# Patient Record
Sex: FEMALE | Race: WHITE | NOT HISPANIC OR LATINO | Employment: OTHER | URBAN - NONMETROPOLITAN AREA
[De-identification: names, ages, dates, MRNs, and addresses within clinical notes are randomized per-mention and may not be internally consistent; named-entity substitution may affect disease eponyms.]

---

## 2017-07-11 DIAGNOSIS — G93.89 BRAIN MASS: Primary | ICD-10-CM

## 2017-07-13 ENCOUNTER — TRANSCRIBE ORDERS (OUTPATIENT)
Dept: ADMINISTRATIVE | Facility: HOSPITAL | Age: 72
End: 2017-07-13

## 2017-07-13 DIAGNOSIS — M81.0 OSTEOPOROSIS, UNSPECIFIED: Primary | ICD-10-CM

## 2017-07-18 ENCOUNTER — HOSPITAL ENCOUNTER (OUTPATIENT)
Dept: BONE DENSITY | Facility: HOSPITAL | Age: 72
Discharge: HOME | End: 2017-07-18
Admitting: INTERNAL MEDICINE

## 2017-07-18 DIAGNOSIS — M81.0 OSTEOPOROSIS, UNSPECIFIED: ICD-10-CM

## 2017-07-18 PROCEDURE — 77080 DXA BONE DENSITY AXIAL: CPT | Performed by: RADIOLOGY

## 2017-07-18 PROCEDURE — 77080 DXA BONE DENSITY AXIAL: CPT

## 2017-08-24 ENCOUNTER — HOSPITAL ENCOUNTER (OUTPATIENT)
Dept: MRI IMAGING | Facility: HOSPITAL | Age: 72
Discharge: HOME | End: 2017-08-24
Admitting: PHYSICIAN ASSISTANT

## 2017-08-24 ENCOUNTER — OFFICE VISIT (OUTPATIENT)
Dept: NEUROSURGERY | Facility: CLINIC | Age: 72
End: 2017-08-24

## 2017-08-24 VITALS
WEIGHT: 133.6 LBS | OXYGEN SATURATION: 97 % | RESPIRATION RATE: 18 BRPM | HEIGHT: 62 IN | BODY MASS INDEX: 24.59 KG/M2 | DIASTOLIC BLOOD PRESSURE: 81 MMHG | SYSTOLIC BLOOD PRESSURE: 156 MMHG | HEART RATE: 98 BPM

## 2017-08-24 DIAGNOSIS — D33.0 BENIGN NEOPLASM OF SUPRATENTORIAL REGION OF BRAIN (HCC): Primary | ICD-10-CM

## 2017-08-24 DIAGNOSIS — G93.89 BRAIN MASS: ICD-10-CM

## 2017-08-24 PROCEDURE — 99213 OFFICE O/P EST LOW 20 MIN: CPT | Performed by: NEUROLOGICAL SURGERY

## 2017-08-24 PROCEDURE — A9577 INJ MULTIHANCE: HCPCS | Performed by: PHYSICIAN ASSISTANT

## 2017-08-24 PROCEDURE — 70553 MRI BRAIN STEM W/O & W/DYE: CPT

## 2017-08-24 PROCEDURE — 0 GADOBENATE DIMEGLUMINE 529 MG/ML SOLUTION: Performed by: PHYSICIAN ASSISTANT

## 2017-08-24 PROCEDURE — 70553 MRI BRAIN STEM W/O & W/DYE: CPT | Performed by: RADIOLOGY

## 2017-08-24 RX ADMIN — GADOBENATE DIMEGLUMINE 12 ML: 529 INJECTION, SOLUTION INTRAVENOUS at 11:15

## 2017-08-24 NOTE — PROGRESS NOTES
Estella Kaur  1945  [id number]      No chief complaint on file.      HISTORY OF PRESENT ILLNESS:  [ 71-year-old admitted for follow-up of a interventricular benign tumor.  She's has no symptoms]    Past Medical History:   Diagnosis Date   • Arthritis    • Disease of thyroid gland    • Fracture of right distal radius    • History of transfusion    • Hypercholesteremia        Past Surgical History:   Procedure Laterality Date   • APPENDECTOMY     • CHOLECYSTECTOMY     • HYSTERECTOMY     • JOINT REPLACEMENT        hips    both   • ORIF WRIST FRACTURE Right 8/24/2016    Procedure: WRIST OPEN REDUCTION INTERNAL FIXATION;  Surgeon: Shadi Womack MD;  Location: [facility];  Service:    • THYROID SURGERY  2002    thyroidectomy w/ metal clips   • TOTAL HIP ARTHROPLASTY      bilateral        Family History   Problem Relation Age of Onset   • Hyperlipidemia Mother    • Hyperlipidemia Father        Social History     Social History   • Marital status:      Spouse name: N/A   • Number of children: N/A   • Years of education: N/A     Occupational History   • Not on file.     Social History Main Topics   • Smoking status: Never Smoker   • Smokeless tobacco: Never Used   • Alcohol use No   • Drug use: No   • Sexual activity: Defer     Other Topics Concern   • Not on file     Social History Narrative       Allergies   Allergen Reactions   • Codeine Nausea And Vomiting   • Contrast Dye Nausea And Vomiting         Current Outpatient Prescriptions:   •  Garlic 1000 MG capsule, Take 1 tablet by mouth., Disp: , Rfl:   •  naproxen sodium (ANAPROX) 550 MG tablet, TK 1 T PO BID PRN, Disp: , Rfl: 0  •  pravastatin (PRAVACHOL) 40 MG tablet, Take 40 mg by mouth daily. Patient reports she is unable to take medication r/t leg pain., Disp: , Rfl:   •  traMADol (ULTRAM) 50 MG tablet, Take 1 tablet by mouth every 6 (six) hours as needed for moderate pain (4-6) for up to 20 doses., Disp: 20 tablet, Rfl: 0  No current  "facility-administered medications for this visit.     Review of Systems   HENT: Positive for ear discharge and ear pain.    Neurological: Positive for dizziness and light-headedness.   All other systems reviewed and are negative.      Vitals:    08/24/17 1400   BP: 156/81   Pulse: 98   Resp: 18   SpO2: 97%   Weight: 133 lb 9.6 oz (60.6 kg)   Height: 62\" (157.5 cm)       Neurological Examination:        Mental status/speech: The patient is alert and oriented.  Speech is clear without aphysia or dysarthria.  No overt cognitive deficits.    Cranial nerve examination:    Olfaction: Smell is intact.  Vision: Vision is intact without visual field abnormalities.  Funduscopic examination is normal.  No pupillary irregularity.  Ocular motor examination: The extraocular muscles are intact.  There is no diplopia.  The pupil is round and reactive to both light and accommodation.  There is no nystagmus.  Facial movement/sensation: There is no facial weakness.  Sensation is intact in the first, second, and third divisions of the trigeminal nerve.  The corneal reflex is intact.  Auditory: Hearing is intact to finger rub bilaterally.  Cranial nerves IX, X, XI, XII: Phonation is normal.  No dysphagia.  Tongue is protruded in the midline without atrophy.  The gag reflex is intact.  Shoulder shrug is normal.    Musculoligamentous ligamentous examination: [No weakness, sensory loss or reflex asymmetry.  Gait is normal ]    Medical Decision Making:     Diagnostic Data Set:  [MRI of the brain shows a interventricular lesion along the septum pellucidum.  It is unchanged in size measuring approximately 4 cm. ]      Assessment:  A interventricular meningioma          Recommendations:  She is asymptomatic; this is not changed in size therefore there is no call for surgical intervention in this particular case.  I will continue to follow her on a yearly basis.  She will call me should she develop symptoms.  This lesion is probably " unresectable.  It is unlikely that they will be of clinical relevance or significance.  Follow-up however should be done.        I greatly appreciate the opportunity to see and evaluate this individual.  If you have questions or concerns regarding issues that I may have overlooked please call me at any time: [phone].  Calin Trinidad M.D.  [facility]  [address] [place] [address].  [place] [phone]

## 2017-11-20 ENCOUNTER — TRANSCRIBE ORDERS (OUTPATIENT)
Dept: ADMINISTRATIVE | Facility: HOSPITAL | Age: 72
End: 2017-11-20

## 2017-11-20 DIAGNOSIS — Z12.31 VISIT FOR SCREENING MAMMOGRAM: Primary | ICD-10-CM

## 2017-12-15 ENCOUNTER — HOSPITAL ENCOUNTER (OUTPATIENT)
Dept: MAMMOGRAPHY | Facility: HOSPITAL | Age: 72
Discharge: HOME | End: 2017-12-15
Admitting: INTERNAL MEDICINE

## 2017-12-15 DIAGNOSIS — Z12.31 VISIT FOR SCREENING MAMMOGRAM: ICD-10-CM

## 2017-12-15 PROCEDURE — 77063 BREAST TOMOSYNTHESIS BI: CPT | Performed by: RADIOLOGY

## 2017-12-15 PROCEDURE — G0202 SCR MAMMO BI INCL CAD: HCPCS

## 2017-12-15 PROCEDURE — G0202 SCR MAMMO BI INCL CAD: HCPCS | Performed by: RADIOLOGY

## 2017-12-15 PROCEDURE — 77063 BREAST TOMOSYNTHESIS BI: CPT

## 2018-03-14 ENCOUNTER — OFFICE VISIT (OUTPATIENT)
Dept: GASTROENTEROLOGY | Facility: CLINIC | Age: 73
End: 2018-03-14

## 2018-03-14 VITALS
WEIGHT: 137.2 LBS | SYSTOLIC BLOOD PRESSURE: 165 MMHG | OXYGEN SATURATION: 97 % | DIASTOLIC BLOOD PRESSURE: 83 MMHG | BODY MASS INDEX: 25.25 KG/M2 | HEIGHT: 62 IN | HEART RATE: 85 BPM

## 2018-03-14 DIAGNOSIS — R12 HEARTBURN: ICD-10-CM

## 2018-03-14 DIAGNOSIS — Z87.19 HISTORY OF DUODENAL ULCER: ICD-10-CM

## 2018-03-14 DIAGNOSIS — R10.11 RUQ ABDOMINAL PAIN: Primary | ICD-10-CM

## 2018-03-14 DIAGNOSIS — K76.89 HEPATIC CYST: ICD-10-CM

## 2018-03-14 PROCEDURE — 99214 OFFICE O/P EST MOD 30 MIN: CPT | Performed by: PHYSICIAN ASSISTANT

## 2018-03-14 RX ORDER — DEXLANSOPRAZOLE 60 MG/1
60 CAPSULE, DELAYED RELEASE ORAL DAILY
Qty: 30 CAPSULE | Refills: 5 | Status: SHIPPED | OUTPATIENT
Start: 2018-03-14 | End: 2018-05-29 | Stop reason: SINTOL

## 2018-03-14 RX ORDER — LOVASTATIN 40 MG/1
40 TABLET ORAL NIGHTLY
COMMUNITY

## 2018-03-14 RX ORDER — RANITIDINE 150 MG/1
150 TABLET ORAL 2 TIMES DAILY
Status: ON HOLD | COMMUNITY
End: 2018-07-08

## 2018-03-14 NOTE — PROGRESS NOTES
": 1945    Chief Complaint   Patient presents with   • Abdominal Pain     RUQ       Estella Kaur is a 72 y.o. female who presents to the office today as a new patient for evaluation of Abdominal Pain (RUQ).    History of Present Illness:  Abdominal pain has been progressively getting worse over the past 1 month but has been present for several months now. RUQ abdominal pain is present and dull. She had duodenal ulcer in the past. Last EGD was several years ago. Some nausea is present but no vomiting. Zantac 150 mg is taken up to 2 times per day. Even with this, she has heartburn. She has trouble lying on her right side and it feels like she is lying on \"a rock.\" S/p cholecystectomy. History of hepatic cysts. She had 1 large stone prior to cholecystectomy.     2018, she had normal labs including WBC, Hgb, HCT, AST and ALT. She does not take any pain medications and will only take Advil as needed.     BMs are described as daily and \"mixed\" with intermittent diarrhea. She points to #4 on the [place] Stool Scale. Stools aren't usually hard. No rectal bleeding or melena. Her last colonoscopy was in  by a provider in [place], [place]. She did not have polyps at that time. There is no known family history of colon cancer or colon polyps.      Answers for HPI/ROS submitted by the patient on 3/14/2018   Abdominal pain  Chronicity: recurrent  Onset: more than 1 month ago  Onset quality: gradual  Frequency: constantly  Episode duration: 1 hours  Progression since onset: waxing and waning  Pain location: RUQ  Pain - numeric: 4/10  Pain quality: aching, dull, a sensation of fullness  Radiates to: RUQ  anorexia: No  belching: Yes  flatus: No  hematochezia: No  melena: No  weight loss: No  Aggravated by: certain positions  Relieved by: certain positions    Review of Systems   Constitutional: Negative for fever.   Gastrointestinal: Positive for abdominal pain, diarrhea and nausea. Negative for constipation and " vomiting.   Genitourinary: Negative for dysuria, frequency and hematuria.   Musculoskeletal: Negative for arthralgias and myalgias.   Neurological: Negative for headaches.   All other systems reviewed and are negative.      Past Medical History:   Diagnosis Date   • Arthritis    • Disease of thyroid gland    • Fracture of right distal radius    • History of transfusion    • Hypercholesteremia        Past Surgical History:   Procedure Laterality Date   • APPENDECTOMY     • CHOLECYSTECTOMY     • COLONOSCOPY     • ENDOSCOPY     • HYSTERECTOMY     • JOINT REPLACEMENT        hips    both   • ORIF WRIST FRACTURE Right 8/24/2016    Procedure: WRIST OPEN REDUCTION INTERNAL FIXATION;  Surgeon: Shadi Womack MD;  Location: [facility];  Service:    • THYROID SURGERY  2002    thyroidectomy w/ metal clips   • TOTAL HIP ARTHROPLASTY      bilateral        Family History   Problem Relation Age of Onset   • Hyperlipidemia Mother    • Hyperlipidemia Father    • Breast cancer Neg Hx        Social History     Social History   • Marital status:      Social History Main Topics   • Smoking status: Never Smoker   • Smokeless tobacco: Never Used   • Alcohol use No   • Drug use: No   • Sexual activity: Defer     Other Topics Concern   • Not on file       Current Outpatient Prescriptions:   •  Coenzyme Q10 (CO Q 10 PO), Take  by mouth., Disp: , Rfl:   •  lovastatin (MEVACOR) 40 MG tablet, Take 40 mg by mouth Every Night., Disp: , Rfl:   •  raNITIdine (ZANTAC) 150 MG tablet, Take 150 mg by mouth 2 (Two) Times a Day., Disp: , Rfl:   •  Garlic 1000 MG capsule, Take 1 tablet by mouth., Disp: , Rfl:   •  naproxen sodium (ANAPROX) 550 MG tablet, TK 1 T PO BID PRN, Disp: , Rfl: 0  •  pravastatin (PRAVACHOL) 40 MG tablet, Take 40 mg by mouth daily. Patient reports she is unable to take medication r/t leg pain., Disp: , Rfl:   •  traMADol (ULTRAM) 50 MG tablet, Take 1 tablet by mouth every 6 (six) hours as needed for moderate pain  "(4-6) for up to 20 doses., Disp: 20 tablet, Rfl: 0    Allergies:   Codeine and Contrast dye    Vitals:  /83   Pulse 85   Ht 157.5 cm (62\")   Wt 62.2 kg (137 lb 3.2 oz)   LMP 08/24/1969 (Within Months)   SpO2 97%   BMI 25.09 kg/m²     Physical Exam   Constitutional: She is oriented to person, place, and time. She appears well-developed and well-nourished. No distress.   HENT:   Head: Normocephalic and atraumatic.   Nose: Nose normal.   Mouth/Throat: Oropharynx is clear and moist.   Eyes: Conjunctivae are normal. Right eye exhibits no discharge. Left eye exhibits no discharge. No scleral icterus.   Neck: Normal range of motion. No JVD present.   Cardiovascular: Normal rate, regular rhythm and normal heart sounds.  Exam reveals no gallop and no friction rub.    No murmur heard.  Pulmonary/Chest: Effort normal and breath sounds normal. No respiratory distress. She has no wheezes. She has no rales. She exhibits no tenderness.   Abdominal: Soft. Bowel sounds are normal. She exhibits no mass. There is tenderness (RUQ, moderate).   Musculoskeletal: Normal range of motion. She exhibits no edema or deformity.   Neurological: She is alert and oriented to person, place, and time. Coordination normal.   Skin: Skin is warm and dry. No rash noted. She is not diaphoretic. No erythema.   Psychiatric: She has a normal mood and affect. Her behavior is normal. Judgment and thought content normal.   Vitals reviewed.      Assessment/Plan:  1. RUQ abdominal pain    2. Hepatic cyst    3. History of duodenal ulcer    4. Heartburn      Orders Placed This Encounter   Procedures   • XR Abdomen Flat & Upright   • US Abdomen Complete     New Medications Ordered This Visit   Medications   • dexlansoprazole (DEXILANT) 60 MG capsule     Sig: Take 1 capsule by mouth Daily.     Dispense:  30 capsule     Refill:  5     She was offered EGD as well, but she would like to defer for now. She will complete abdominal film and US abdomen due to " complaints and history of liver lesion. Dexilant 60 mg will be started, take once daily for treatment of GERD. She can continue to take Zantac when starting the Dexilant, but after a few days, she can change to taking Zantac only as needed for breakthrough symptoms of heartburn.         Return in about 1 month (around 4/14/2018) for discussion of results.      Electronically signed 3/21/2018 1:05 PM  Kailey Acharya PA-C, [facility]

## 2018-03-23 ENCOUNTER — HOSPITAL ENCOUNTER (OUTPATIENT)
Dept: ULTRASOUND IMAGING | Facility: HOSPITAL | Age: 73
Discharge: HOME | End: 2018-03-23
Admitting: PHYSICIAN ASSISTANT

## 2018-03-23 DIAGNOSIS — K76.89 HEPATIC CYST: ICD-10-CM

## 2018-03-23 DIAGNOSIS — R10.11 RUQ ABDOMINAL PAIN: ICD-10-CM

## 2018-03-23 DIAGNOSIS — R12 HEARTBURN: ICD-10-CM

## 2018-03-23 PROCEDURE — 76700 US EXAM ABDOM COMPLETE: CPT

## 2018-03-23 PROCEDURE — 76700 US EXAM ABDOM COMPLETE: CPT | Performed by: RADIOLOGY

## 2018-03-26 ENCOUNTER — DOCUMENTATION (OUTPATIENT)
Dept: GASTROENTEROLOGY | Facility: CLINIC | Age: 73
End: 2018-03-26

## 2018-03-26 NOTE — PROGRESS NOTES
I spoke with patient and told her insurance had approved paying for Dexilant. Patient was aware, but said her part to pay was $150.00. She said she got it from the pharmacy for 1 month and will see how well it works and call us back.

## 2018-03-27 ENCOUNTER — HOSPITAL ENCOUNTER (OUTPATIENT)
Dept: GENERAL RADIOLOGY | Facility: HOSPITAL | Age: 73
Discharge: HOME | End: 2018-03-27
Admitting: PHYSICIAN ASSISTANT

## 2018-03-27 DIAGNOSIS — R10.11 RUQ ABDOMINAL PAIN: ICD-10-CM

## 2018-03-27 PROCEDURE — 74019 RADEX ABDOMEN 2 VIEWS: CPT

## 2018-03-27 PROCEDURE — 74019 RADEX ABDOMEN 2 VIEWS: CPT | Performed by: RADIOLOGY

## 2018-04-17 ENCOUNTER — OFFICE VISIT (OUTPATIENT)
Dept: GASTROENTEROLOGY | Facility: CLINIC | Age: 73
End: 2018-04-17

## 2018-04-17 VITALS — HEART RATE: 80 BPM | WEIGHT: 135 LBS | OXYGEN SATURATION: 97 % | BODY MASS INDEX: 25.49 KG/M2 | HEIGHT: 61 IN

## 2018-04-17 DIAGNOSIS — R10.11 RIGHT UPPER QUADRANT ABDOMINAL PAIN: Primary | ICD-10-CM

## 2018-04-17 DIAGNOSIS — K27.9 PUD (PEPTIC ULCER DISEASE): ICD-10-CM

## 2018-04-17 DIAGNOSIS — Z12.11 COLON CANCER SCREENING: ICD-10-CM

## 2018-04-17 PROCEDURE — 99213 OFFICE O/P EST LOW 20 MIN: CPT | Performed by: INTERNAL MEDICINE

## 2018-04-17 NOTE — PROGRESS NOTES
Chief Complaint   Patient presents with   • Abdominal Pain       Estella Kaur is a 72 y.o. female who presents to the office today for follow up appointment for Abdominal Pain  .    HPI  72-year-old white female presents for follow-up.  She reports persistent RUQ pain despite recent treatment with Dexilant 60 mg daily.  Denies associated nausea/vomiting, change in bowel function, overt rectal bleeding.  Recent ultrasound showed benign appearing liver cysts--she says that this has previously been identified.  She reports history of PUD.  She reports having EGD performed a few years ago.  Colonoscopy was last performed about 11 years ago.        Review of Systems   Constitutional: Negative for fatigue.   HENT: Negative for trouble swallowing.    Eyes: Negative.    Respiratory: Negative for shortness of breath.    Cardiovascular: Negative for chest pain.   Gastrointestinal: Positive for abdominal distention, abdominal pain and diarrhea. Negative for anal bleeding, blood in stool, constipation, nausea, rectal pain and vomiting.   Endocrine: Negative.    Genitourinary: Negative.    Musculoskeletal: Negative.    Skin: Negative.    Allergic/Immunologic: Negative.    Neurological: Positive for headaches. Negative for dizziness.   Hematological: Negative.    Psychiatric/Behavioral: Negative.        ACTIVE PROBLEMS:   Specialty Problems     None          PAST MEDICAL HISTORY:  Past Medical History:   Diagnosis Date   • Arthritis    • Disease of thyroid gland    • Fracture of right distal radius    • History of transfusion    • Hypercholesteremia        SURGICAL HISTORY:  Past Surgical History:   Procedure Laterality Date   • APPENDECTOMY     • CHOLECYSTECTOMY     • COLONOSCOPY     • ENDOSCOPY     • HYSTERECTOMY     • JOINT REPLACEMENT        hips    both   • ORIF WRIST FRACTURE Right 8/24/2016    Procedure: WRIST OPEN REDUCTION INTERNAL FIXATION;  Surgeon: Shadi Womack MD;  Location: [facility];  Service:    •  "THYROID SURGERY  2002    thyroidectomy w/ metal clips   • TOTAL HIP ARTHROPLASTY      bilateral        FAMILY HISTORY:  Family History   Problem Relation Age of Onset   • Hyperlipidemia Mother    • Hyperlipidemia Father    • Breast cancer Neg Hx        SOCIAL HISTORY:  Social History   Substance Use Topics   • Smoking status: Never Smoker   • Smokeless tobacco: Never Used   • Alcohol use No       CURRENT MEDICATION:    Current Outpatient Prescriptions:   •  Coenzyme Q10 (CO Q 10 PO), Take  by mouth., Disp: , Rfl:   •  dexlansoprazole (DEXILANT) 60 MG capsule, Take 1 capsule by mouth Daily., Disp: 30 capsule, Rfl: 5  •  Garlic 1000 MG capsule, Take 1 tablet by mouth., Disp: , Rfl:   •  lovastatin (MEVACOR) 40 MG tablet, Take 40 mg by mouth Every Night., Disp: , Rfl:   •  naproxen sodium (ANAPROX) 550 MG tablet, TK 1 T PO BID PRN, Disp: , Rfl: 0  •  pravastatin (PRAVACHOL) 40 MG tablet, Take 40 mg by mouth daily. Patient reports she is unable to take medication r/t leg pain., Disp: , Rfl:   •  raNITIdine (ZANTAC) 150 MG tablet, Take 150 mg by mouth 2 (Two) Times a Day., Disp: , Rfl:   •  polyethylene glycol (GoLYTELY) 236 g solution, Starting at 6 pm on day prior to procedure, drink 8 ounces every 15 minutes until all gone or stools are clear. May add flavor packet., Disp: 4000 mL, Rfl: 0    ALLERGIES:  Codeine and Contrast dye    VISIT VITALS:  Pulse 80   Ht 155.8 cm (61.34\")   Wt 61.2 kg (135 lb)   LMP 08/24/1969 (Within Months)   SpO2 97%   BMI 25.23 kg/m²     Physical Exam   Constitutional: She is oriented to person, place, and time. She appears well-developed and well-nourished.   HENT:   Head: Normocephalic and atraumatic.   Eyes: Conjunctivae and EOM are normal. Pupils are equal, round, and reactive to light.   Neck: Normal range of motion. Neck supple.   Cardiovascular: Normal rate, regular rhythm and normal heart sounds.    Pulmonary/Chest: Effort normal and breath sounds normal.   Abdominal: Soft. Bowel " sounds are normal.   Musculoskeletal: Normal range of motion.   Neurological: She is alert and oriented to person, place, and time. She has normal reflexes.   Skin: Skin is warm and dry.   Psychiatric: She has a normal mood and affect. Her behavior is normal.   Nursing note and vitals reviewed.      Assessment/Plan      Diagnosis Plan   1. Right upper quadrant abdominal pain  Case Request   2. PUD (peptic ulcer disease)  Case Request   3. Colon cancer screening  Case Request     REC  I have recommended that she undergo both EGD and screening/surveillance colonoscopy for further evaluation.  The procedures, benefits, risks and alternatives were explained to the patient.  I have recommended no changes in her medical treatment at this time.    Return if symptoms worsen or fail to improve.         Wade Ramos III, MD

## 2018-05-15 ENCOUNTER — ANESTHESIA (OUTPATIENT)
Dept: PERIOP | Facility: HOSPITAL | Age: 73
End: 2018-05-15

## 2018-05-15 ENCOUNTER — ANESTHESIA EVENT (OUTPATIENT)
Dept: PERIOP | Facility: HOSPITAL | Age: 73
End: 2018-05-15

## 2018-05-15 ENCOUNTER — HOSPITAL ENCOUNTER (OUTPATIENT)
Facility: HOSPITAL | Age: 73
Setting detail: HOSPITAL OUTPATIENT SURGERY
Discharge: HOME | End: 2018-05-15
Attending: INTERNAL MEDICINE | Admitting: INTERNAL MEDICINE

## 2018-05-15 VITALS
SYSTOLIC BLOOD PRESSURE: 158 MMHG | RESPIRATION RATE: 18 BRPM | OXYGEN SATURATION: 97 % | DIASTOLIC BLOOD PRESSURE: 88 MMHG | TEMPERATURE: 97.1 F | HEART RATE: 74 BPM

## 2018-05-15 DIAGNOSIS — K27.9 PUD (PEPTIC ULCER DISEASE): ICD-10-CM

## 2018-05-15 DIAGNOSIS — Z12.11 COLON CANCER SCREENING: ICD-10-CM

## 2018-05-15 DIAGNOSIS — R10.11 RIGHT UPPER QUADRANT ABDOMINAL PAIN: ICD-10-CM

## 2018-05-15 PROCEDURE — 43239 EGD BIOPSY SINGLE/MULTIPLE: CPT | Performed by: INTERNAL MEDICINE

## 2018-05-15 PROCEDURE — 25010000002 FENTANYL CITRATE (PF) 100 MCG/2ML SOLUTION: Performed by: NURSE ANESTHETIST, CERTIFIED REGISTERED

## 2018-05-15 PROCEDURE — G0121 COLON CA SCRN NOT HI RSK IND: HCPCS | Performed by: INTERNAL MEDICINE

## 2018-05-15 PROCEDURE — 25010000002 MIDAZOLAM PER 1 MG: Performed by: NURSE ANESTHETIST, CERTIFIED REGISTERED

## 2018-05-15 PROCEDURE — 25010000002 PROPOFOL 10 MG/ML EMULSION: Performed by: NURSE ANESTHETIST, CERTIFIED REGISTERED

## 2018-05-15 PROCEDURE — 88305 TISSUE EXAM BY PATHOLOGIST: CPT | Performed by: INTERNAL MEDICINE

## 2018-05-15 PROCEDURE — 25010000002 PROPOFOL 1000 MG/ML EMULSION: Performed by: NURSE ANESTHETIST, CERTIFIED REGISTERED

## 2018-05-15 RX ORDER — FENTANYL CITRATE 50 UG/ML
INJECTION, SOLUTION INTRAMUSCULAR; INTRAVENOUS AS NEEDED
Status: DISCONTINUED | OUTPATIENT
Start: 2018-05-15 | End: 2018-05-15 | Stop reason: SURG

## 2018-05-15 RX ORDER — OXYCODONE HYDROCHLORIDE AND ACETAMINOPHEN 5; 325 MG/1; MG/1
1 TABLET ORAL ONCE AS NEEDED
Status: DISCONTINUED | OUTPATIENT
Start: 2018-05-15 | End: 2018-05-15 | Stop reason: HOSPADM

## 2018-05-15 RX ORDER — IPRATROPIUM BROMIDE AND ALBUTEROL SULFATE 2.5; .5 MG/3ML; MG/3ML
3 SOLUTION RESPIRATORY (INHALATION) ONCE AS NEEDED
Status: DISCONTINUED | OUTPATIENT
Start: 2018-05-15 | End: 2018-05-15 | Stop reason: HOSPADM

## 2018-05-15 RX ORDER — ONDANSETRON 2 MG/ML
4 INJECTION INTRAMUSCULAR; INTRAVENOUS ONCE AS NEEDED
Status: DISCONTINUED | OUTPATIENT
Start: 2018-05-15 | End: 2018-05-15 | Stop reason: HOSPADM

## 2018-05-15 RX ORDER — PROPOFOL 10 MG/ML
VIAL (ML) INTRAVENOUS AS NEEDED
Status: DISCONTINUED | OUTPATIENT
Start: 2018-05-15 | End: 2018-05-15 | Stop reason: SURG

## 2018-05-15 RX ORDER — SODIUM CHLORIDE 0.9 % (FLUSH) 0.9 %
1-10 SYRINGE (ML) INJECTION AS NEEDED
Status: DISCONTINUED | OUTPATIENT
Start: 2018-05-15 | End: 2018-05-15 | Stop reason: HOSPADM

## 2018-05-15 RX ORDER — FENTANYL CITRATE 50 UG/ML
50 INJECTION, SOLUTION INTRAMUSCULAR; INTRAVENOUS
Status: DISCONTINUED | OUTPATIENT
Start: 2018-05-15 | End: 2018-05-15 | Stop reason: HOSPADM

## 2018-05-15 RX ORDER — MIDAZOLAM HYDROCHLORIDE 1 MG/ML
INJECTION INTRAMUSCULAR; INTRAVENOUS AS NEEDED
Status: DISCONTINUED | OUTPATIENT
Start: 2018-05-15 | End: 2018-05-15 | Stop reason: SURG

## 2018-05-15 RX ORDER — MEPERIDINE HYDROCHLORIDE 50 MG/ML
12.5 INJECTION INTRAMUSCULAR; INTRAVENOUS; SUBCUTANEOUS
Status: DISCONTINUED | OUTPATIENT
Start: 2018-05-15 | End: 2018-05-15 | Stop reason: HOSPADM

## 2018-05-15 RX ORDER — SODIUM CHLORIDE, SODIUM LACTATE, POTASSIUM CHLORIDE, CALCIUM CHLORIDE 600; 310; 30; 20 MG/100ML; MG/100ML; MG/100ML; MG/100ML
125 INJECTION, SOLUTION INTRAVENOUS CONTINUOUS
Status: DISCONTINUED | OUTPATIENT
Start: 2018-05-15 | End: 2018-05-15 | Stop reason: HOSPADM

## 2018-05-15 RX ADMIN — MIDAZOLAM HYDROCHLORIDE 2 MG: 1 INJECTION, SOLUTION INTRAMUSCULAR; INTRAVENOUS at 08:30

## 2018-05-15 RX ADMIN — SODIUM CHLORIDE, POTASSIUM CHLORIDE, SODIUM LACTATE AND CALCIUM CHLORIDE: 600; 310; 30; 20 INJECTION, SOLUTION INTRAVENOUS at 08:30

## 2018-05-15 RX ADMIN — FENTANYL CITRATE 100 MCG: 50 INJECTION INTRAMUSCULAR; INTRAVENOUS at 08:30

## 2018-05-15 RX ADMIN — PROPOFOL 120 MCG/KG/MIN: 10 INJECTION, EMULSION INTRAVENOUS at 08:34

## 2018-05-15 RX ADMIN — PROPOFOL 50 MG: 10 INJECTION, EMULSION INTRAVENOUS at 08:34

## 2018-05-15 NOTE — H&P (VIEW-ONLY)
Chief Complaint   Patient presents with   • Abdominal Pain       Estella Kaur is a 72 y.o. female who presents to the office today for follow up appointment for Abdominal Pain  .    HPI  72-year-old white female presents for follow-up.  She reports persistent RUQ pain despite recent treatment with Dexilant 60 mg daily.  Denies associated nausea/vomiting, change in bowel function, overt rectal bleeding.  Recent ultrasound showed benign appearing liver cysts--she says that this has previously been identified.  She reports history of PUD.  She reports having EGD performed a few years ago.  Colonoscopy was last performed about 11 years ago.        Review of Systems   Constitutional: Negative for fatigue.   HENT: Negative for trouble swallowing.    Eyes: Negative.    Respiratory: Negative for shortness of breath.    Cardiovascular: Negative for chest pain.   Gastrointestinal: Positive for abdominal distention, abdominal pain and diarrhea. Negative for anal bleeding, blood in stool, constipation, nausea, rectal pain and vomiting.   Endocrine: Negative.    Genitourinary: Negative.    Musculoskeletal: Negative.    Skin: Negative.    Allergic/Immunologic: Negative.    Neurological: Positive for headaches. Negative for dizziness.   Hematological: Negative.    Psychiatric/Behavioral: Negative.        ACTIVE PROBLEMS:   Specialty Problems     None          PAST MEDICAL HISTORY:  Past Medical History:   Diagnosis Date   • Arthritis    • Disease of thyroid gland    • Fracture of right distal radius    • History of transfusion    • Hypercholesteremia        SURGICAL HISTORY:  Past Surgical History:   Procedure Laterality Date   • APPENDECTOMY     • CHOLECYSTECTOMY     • COLONOSCOPY     • ENDOSCOPY     • HYSTERECTOMY     • JOINT REPLACEMENT        hips    both   • ORIF WRIST FRACTURE Right 8/24/2016    Procedure: WRIST OPEN REDUCTION INTERNAL FIXATION;  Surgeon: Shadi Womack MD;  Location: [facility];  Service:    •  "THYROID SURGERY  2002    thyroidectomy w/ metal clips   • TOTAL HIP ARTHROPLASTY      bilateral        FAMILY HISTORY:  Family History   Problem Relation Age of Onset   • Hyperlipidemia Mother    • Hyperlipidemia Father    • Breast cancer Neg Hx        SOCIAL HISTORY:  Social History   Substance Use Topics   • Smoking status: Never Smoker   • Smokeless tobacco: Never Used   • Alcohol use No       CURRENT MEDICATION:    Current Outpatient Prescriptions:   •  Coenzyme Q10 (CO Q 10 PO), Take  by mouth., Disp: , Rfl:   •  dexlansoprazole (DEXILANT) 60 MG capsule, Take 1 capsule by mouth Daily., Disp: 30 capsule, Rfl: 5  •  Garlic 1000 MG capsule, Take 1 tablet by mouth., Disp: , Rfl:   •  lovastatin (MEVACOR) 40 MG tablet, Take 40 mg by mouth Every Night., Disp: , Rfl:   •  naproxen sodium (ANAPROX) 550 MG tablet, TK 1 T PO BID PRN, Disp: , Rfl: 0  •  pravastatin (PRAVACHOL) 40 MG tablet, Take 40 mg by mouth daily. Patient reports she is unable to take medication r/t leg pain., Disp: , Rfl:   •  raNITIdine (ZANTAC) 150 MG tablet, Take 150 mg by mouth 2 (Two) Times a Day., Disp: , Rfl:   •  polyethylene glycol (GoLYTELY) 236 g solution, Starting at 6 pm on day prior to procedure, drink 8 ounces every 15 minutes until all gone or stools are clear. May add flavor packet., Disp: 4000 mL, Rfl: 0    ALLERGIES:  Codeine and Contrast dye    VISIT VITALS:  Pulse 80   Ht 155.8 cm (61.34\")   Wt 61.2 kg (135 lb)   LMP 08/24/1969 (Within Months)   SpO2 97%   BMI 25.23 kg/m²     Physical Exam   Constitutional: She is oriented to person, place, and time. She appears well-developed and well-nourished.   HENT:   Head: Normocephalic and atraumatic.   Eyes: Conjunctivae and EOM are normal. Pupils are equal, round, and reactive to light.   Neck: Normal range of motion. Neck supple.   Cardiovascular: Normal rate, regular rhythm and normal heart sounds.    Pulmonary/Chest: Effort normal and breath sounds normal.   Abdominal: Soft. Bowel " sounds are normal.   Musculoskeletal: Normal range of motion.   Neurological: She is alert and oriented to person, place, and time. She has normal reflexes.   Skin: Skin is warm and dry.   Psychiatric: She has a normal mood and affect. Her behavior is normal.   Nursing note and vitals reviewed.      Assessment/Plan      Diagnosis Plan   1. Right upper quadrant abdominal pain  Case Request   2. PUD (peptic ulcer disease)  Case Request   3. Colon cancer screening  Case Request     REC  I have recommended that she undergo both EGD and screening/surveillance colonoscopy for further evaluation.  The procedures, benefits, risks and alternatives were explained to the patient.  I have recommended no changes in her medical treatment at this time.    Return if symptoms worsen or fail to improve.         Wade Ramos III, MD

## 2018-05-15 NOTE — ANESTHESIA PREPROCEDURE EVALUATION
Anesthesia Evaluation     no history of anesthetic complications:  NPO Solid Status: > 8 hours  NPO Liquid Status: > 8 hours           Airway   Mallampati: II  TM distance: >3 FB  Neck ROM: full  No difficulty expected  Dental - normal exam     Pulmonary - normal exam   Cardiovascular - normal exam    (+) hyperlipidemia,       Neuro/Psych  GI/Hepatic/Renal/Endo    (+)  GERD, PUD,      Musculoskeletal     Abdominal  - normal exam   Substance History      OB/GYN          Other                        Anesthesia Plan    ASA 2     general     Anesthetic plan and risks discussed with patient.

## 2018-05-15 NOTE — OP NOTE
05/15/18    ESOPHAGOGASTRODUODENOSCOPY WITH BIOPSY, COLONOSCOPY  Procedure Note    Estella Kaur  5/15/2018    Dr. Ramos      Pre-op Diagnosis: RUQ pain, GERD, PUD, colon cancer screening, last colonoscopy was performed about 11 years ago      Post-op Diagnosis:   -Small hiatal hernia  -Reflux esophagitis  -Normal colonoscopy        Anesthesia: Per Anesthesia service, general anesthesia      Estimated Blood Loss: Negligible      Findings: EGD was performed with careful examination of the esophagus, stomach, duodenum to the fourth portion.  Small hiatal hernia was present.  A few small benign-appearing superficial linear mucosal erosions were found in the distal esophagus, appearance consistent with reflux esophagitis.  I could not entirely exclude the presence of Sapp's mucosa (minimal) at the EG junction and biopsies were taken from the EG junction.  Stomach and duodenum were unremarkable in appearance.  Biopsies were taken from the gastric antrum in order to check for H. pylori.  Duodenal biopsies were obtained in order to screen for occult small bowel mucosal disease.    Normal rectal examination.  Colonoscopy was performed to the cecum, confirmed by identification of typical cecal anatomy.  The bowel preparation was good.  All areas were examined carefully.  The scope was retroflexed within the rectum.  No abnormality was identified.    She tolerated the procedures well and there were no complications.  She left the OR in stable and satisfactory condition.      Specimens: EG junction, gastric antrum, duodenum      Grafts/Implants: N/A      Complications: None      Recommendations:   Findings were discussed with the patient  Await biopsy findings  Continue present treatment  Repeat screening/surveillance colonoscopy in about 10 years        Wade Ramos III, MD     Date: 5/15/2018  Time: 8:50 AM

## 2018-05-21 LAB
LAB AP CASE REPORT: NORMAL
Lab: NORMAL
PATH REPORT.FINAL DX SPEC: NORMAL

## 2018-05-29 ENCOUNTER — OFFICE VISIT (OUTPATIENT)
Dept: GASTROENTEROLOGY | Facility: CLINIC | Age: 73
End: 2018-05-29

## 2018-05-29 VITALS
BODY MASS INDEX: 25.17 KG/M2 | WEIGHT: 136.8 LBS | HEIGHT: 62 IN | SYSTOLIC BLOOD PRESSURE: 166 MMHG | DIASTOLIC BLOOD PRESSURE: 81 MMHG | HEART RATE: 76 BPM | OXYGEN SATURATION: 98 %

## 2018-05-29 DIAGNOSIS — K21.9 GASTROESOPHAGEAL REFLUX DISEASE, ESOPHAGITIS PRESENCE NOT SPECIFIED: Primary | ICD-10-CM

## 2018-05-29 DIAGNOSIS — R19.8 ABNORMAL FINDINGS ON ESOPHAGOGASTRODUODENOSCOPY (EGD): ICD-10-CM

## 2018-05-29 DIAGNOSIS — R19.7 DIARRHEA, UNSPECIFIED TYPE: ICD-10-CM

## 2018-05-29 PROCEDURE — 99214 OFFICE O/P EST MOD 30 MIN: CPT | Performed by: PHYSICIAN ASSISTANT

## 2018-05-29 RX ORDER — ESOMEPRAZOLE MAGNESIUM 40 MG/1
40 CAPSULE, DELAYED RELEASE ORAL
Qty: 30 CAPSULE | Refills: 5 | Status: SHIPPED | OUTPATIENT
Start: 2018-05-29 | End: 2018-10-19 | Stop reason: SDUPTHER

## 2018-05-29 NOTE — PROGRESS NOTES
Chief Complaint   Patient presents with   • Diarrhea     procedure follow up       Estella Kaur is a 72 y.o. female who presents to the office today for follow up appointment regarding diarrhea and EGD follow up.     HPI  Dexilant seems to be causing diarrhea and she has had episodes of fecal incontinence only since starting it. She has only taken Zantac previously without relief. So far, she has elevated the HOB, changed to diet soda but has had trouble with trying to stop caffeine. Never had Sapp's esophagus on previous EGDs. Previous abdominal film was normal. Stools occur daily without straining or blood usually. There is no known family history of colon cancer or colon polyps.    She had EGD and colonoscopy performed by Dr. Ramos on 5/15/2018 which revealed:  -Small hiatal hernia  -Reflux esophagitis  -Normal colonoscopy      Pathology:      Review of Systems   Constitutional: Negative for chills, fatigue and fever.   HENT: Negative for congestion, sore throat, trouble swallowing and voice change.    Eyes: Positive for discharge, redness and itching.   Respiratory: Negative for cough, shortness of breath and wheezing.    Cardiovascular: Positive for palpitations. Negative for chest pain and leg swelling.   Gastrointestinal: Positive for abdominal pain and diarrhea. Negative for abdominal distention, anal bleeding, blood in stool, constipation, nausea, rectal pain and vomiting.   Endocrine: Negative for cold intolerance and heat intolerance.   Genitourinary: Negative for difficulty urinating and pelvic pain.   Musculoskeletal: Positive for arthralgias, back pain and myalgias.   Skin: Negative for rash and wound.   Allergic/Immunologic: Positive for environmental allergies. Negative for food allergies.   Neurological: Positive for dizziness and light-headedness. Negative for syncope and headaches.   Hematological: Bruises/bleeds easily.   Psychiatric/Behavioral: Positive for sleep disturbance. The patient is  "nervous/anxious.      Specialty Problems        Gastroenterology Problems    PUD (peptic ulcer disease)            Past Medical History:   Diagnosis Date   • Arthritis    • Disease of thyroid gland    • Elevated cholesterol    • Fracture of right distal radius    • GERD (gastroesophageal reflux disease)    • History of transfusion    • Hypercholesteremia      Past Surgical History:   Procedure Laterality Date   • APPENDECTOMY     • CHOLECYSTECTOMY     • COLONOSCOPY     • COLONOSCOPY N/A 5/15/2018    Procedure: COLONOSCOPY  CPTCODE:27314;  Surgeon: Wade Ramos III, MD;  Location: [facility] OR;  Service: Gastroenterology   • ENDOSCOPY     • ENDOSCOPY N/A 5/15/2018    Procedure: ESOPHAGOGASTRODUODENOSCOPY WITH BIOPSY  CPTCODE:45615;  Surgeon: Wade Ramos III, MD;  Location: [facility] OR;  Service: Gastroenterology   • HYSTERECTOMY     • JOINT REPLACEMENT        hips    both   • ORIF WRIST FRACTURE Right 8/24/2016    Procedure: WRIST OPEN REDUCTION INTERNAL FIXATION;  Surgeon: Shadi Womack MD;  Location: [facility] OR;  Service:    • THYROID SURGERY  2002    thyroidectomy w/ metal clips   • TOTAL HIP ARTHROPLASTY      bilateral      Family History   Problem Relation Age of Onset   • Hyperlipidemia Mother    • Hyperlipidemia Father    • Breast cancer Neg Hx      Social History   Substance Use Topics   • Smoking status: Never Smoker   • Smokeless tobacco: Never Used   • Alcohol use No       CURRENT MEDICATION:  •  Coenzyme Q10 (CO Q 10 PO), Take  by mouth., Disp: , Rfl:   •  dexlansoprazole (DEXILANT) 60 MG capsule, Take 1 capsule by mouth Daily., Disp: 30 capsule, Rfl: 5  •  lovastatin (MEVACOR) 40 MG tablet, Take 40 mg by mouth Every Night., Disp: , Rfl:   •  raNITIdine (ZANTAC) 150 MG tablet, Take 150 mg by mouth 2 (Two) Times a Day., Disp: , Rfl:     ALLERGIES:  Codeine and Contrast dye    VISIT VITALS:  /81   Pulse 76   Ht 157.5 cm (62\")   Wt 62.1 kg (136 lb 12.8 oz)   LMP 08/24/1969 " (Within Months)   SpO2 98%   BMI 25.02 kg/m²     Physical Exam   Constitutional: She is oriented to person, place, and time. She appears well-developed and well-nourished. No distress.   HENT:   Head: Normocephalic and atraumatic.   Nose: Nose normal.   Mouth/Throat: Oropharynx is clear and moist.   Eyes: Conjunctivae are normal. Right eye exhibits no discharge. Left eye exhibits no discharge. No scleral icterus.   Neck: Normal range of motion. No JVD present.   Cardiovascular: Normal rate, regular rhythm and normal heart sounds.  Exam reveals no gallop and no friction rub.    No murmur heard.  Pulmonary/Chest: Effort normal and breath sounds normal. No respiratory distress. She has no wheezes. She has no rales. She exhibits no tenderness.   Abdominal: Soft. Bowel sounds are normal. She exhibits no mass. There is tenderness (epigastric).   Musculoskeletal: Normal range of motion. She exhibits no edema or deformity.   Neurological: She is alert and oriented to person, place, and time. Coordination normal.   Skin: Skin is warm and dry. No rash noted. She is not diaphoretic. No erythema.   Psychiatric: She has a normal mood and affect. Her behavior is normal. Judgment and thought content normal.   Vitals reviewed.      Assessment/Plan     1. Gastroesophageal reflux disease, esophagitis presence not specified    2. Abnormal findings on esophagogastroduodenoscopy (EGD)    3. Diarrhea, unspecified type      New Medications Ordered This Visit   Medications   • esomeprazole (nexIUM) 40 MG capsule     Sig: Take 1 capsule by mouth Every Morning Before Breakfast.     Dispense:  30 capsule     Refill:  5     Due to findings of Sapp's esophagus on recent EGD (depending on biopsy relation to the Z line), she will have repeat EGD in 1 year to confirm. We will change Dexilant 60 mg once daily to Nexium 40 mg once daily 30 minutes before a meal to control GERD and hopefully resolve side effect of diarrhea that she is having  with it.     She was instructed not to lie down immediately after eating (wait at least 3 hours after meals), elevate the head of the bed at night, avoid spicy foods, avoid mints, avoid caffeine, avoid nicotine and maintain a healthy weight.         Return if symptoms worsen or fail to improve.      Electronically signed 5/29/2018 11:07 AM  Kailey Acharya PA-C, [facility] Gastroenterology

## 2018-07-08 ENCOUNTER — APPOINTMENT (OUTPATIENT)
Dept: CARDIOLOGY | Facility: HOSPITAL | Age: 73
End: 2018-07-08
Attending: INTERNAL MEDICINE

## 2018-07-08 ENCOUNTER — APPOINTMENT (OUTPATIENT)
Dept: GENERAL RADIOLOGY | Facility: HOSPITAL | Age: 73
End: 2018-07-08

## 2018-07-08 ENCOUNTER — HOSPITAL ENCOUNTER (OUTPATIENT)
Facility: HOSPITAL | Age: 73
Setting detail: OBSERVATION
Discharge: HOME | End: 2018-07-10
Attending: EMERGENCY MEDICINE | Admitting: PHYSICIAN ASSISTANT

## 2018-07-08 DIAGNOSIS — R07.9 CHEST PAIN, UNSPECIFIED TYPE: Primary | ICD-10-CM

## 2018-07-08 LAB
ALBUMIN SERPL-MCNC: 4.5 G/DL (ref 3.4–4.8)
ALBUMIN/GLOB SERPL: 1.7 G/DL (ref 1.5–2.5)
ALP SERPL-CCNC: 110 U/L (ref 35–104)
ALT SERPL W P-5'-P-CCNC: 21 U/L (ref 10–36)
AMYLASE SERPL-CCNC: 79 U/L (ref 28–100)
ANION GAP SERPL CALCULATED.3IONS-SCNC: 6.5 MMOL/L (ref 3.6–11.2)
APTT PPP: 33.8 SECONDS (ref 23.8–36.1)
AST SERPL-CCNC: 23 U/L (ref 10–30)
BASOPHILS # BLD AUTO: 0.03 10*3/MM3 (ref 0–0.3)
BASOPHILS NFR BLD AUTO: 0.5 % (ref 0–2)
BH CV ECHO MEAS - % IVS THICK: -6.7 %
BH CV ECHO MEAS - % LVPW THICK: 20.7 %
BH CV ECHO MEAS - ACS: 1.9 CM
BH CV ECHO MEAS - AI DEC SLOPE: 113.7 CM/SEC^2
BH CV ECHO MEAS - AO MAX PG: 8.8 MMHG
BH CV ECHO MEAS - AO MEAN PG: 4 MMHG
BH CV ECHO MEAS - AO ROOT AREA (BSA CORRECTED): 1.8
BH CV ECHO MEAS - AO ROOT AREA: 6.6 CM^2
BH CV ECHO MEAS - AO ROOT DIAM: 2.9 CM
BH CV ECHO MEAS - AO V2 MAX: 148 CM/SEC
BH CV ECHO MEAS - AO V2 MEAN: 91.8 CM/SEC
BH CV ECHO MEAS - AO V2 VTI: 30.4 CM
BH CV ECHO MEAS - BSA(HAYCOCK): 1.6 M^2
BH CV ECHO MEAS - BSA: 1.6 M^2
BH CV ECHO MEAS - BZI_BMI: 25.3 KILOGRAMS/M^2
BH CV ECHO MEAS - BZI_METRIC_HEIGHT: 154.9 CM
BH CV ECHO MEAS - BZI_METRIC_WEIGHT: 60.8 KG
BH CV ECHO MEAS - CONTRAST EF 4CH: 62.9 ML/M^2
BH CV ECHO MEAS - EDV(CUBED): 74.1 ML
BH CV ECHO MEAS - EDV(MOD-SP4): 35 ML
BH CV ECHO MEAS - EDV(TEICH): 78.6 ML
BH CV ECHO MEAS - EF(CUBED): 67 %
BH CV ECHO MEAS - EF(MOD-SP4): 62.9 %
BH CV ECHO MEAS - EF(TEICH): 58.9 %
BH CV ECHO MEAS - ESV(CUBED): 24.4 ML
BH CV ECHO MEAS - ESV(MOD-SP4): 13 ML
BH CV ECHO MEAS - ESV(TEICH): 32.3 ML
BH CV ECHO MEAS - FS: 30.9 %
BH CV ECHO MEAS - IVS/LVPW: 1.2
BH CV ECHO MEAS - IVSD: 1.3 CM
BH CV ECHO MEAS - IVSS: 1.2 CM
BH CV ECHO MEAS - LA DIMENSION: 2.6 CM
BH CV ECHO MEAS - LA/AO: 0.9
BH CV ECHO MEAS - LV DIASTOLIC VOL/BSA (35-75): 22 ML/M^2
BH CV ECHO MEAS - LV MASS(C)D: 177.2 GRAMS
BH CV ECHO MEAS - LV MASS(C)DI: 111.2 GRAMS/M^2
BH CV ECHO MEAS - LV MASS(C)S: 113.6 GRAMS
BH CV ECHO MEAS - LV MASS(C)SI: 71.3 GRAMS/M^2
BH CV ECHO MEAS - LV SYSTOLIC VOL/BSA (12-30): 8.2 ML/M^2
BH CV ECHO MEAS - LVIDD: 4.2 CM
BH CV ECHO MEAS - LVIDS: 2.9 CM
BH CV ECHO MEAS - LVLD AP4: 5 CM
BH CV ECHO MEAS - LVLS AP4: 4.4 CM
BH CV ECHO MEAS - LVOT AREA (M): 3.1 CM^2
BH CV ECHO MEAS - LVOT AREA: 3.2 CM^2
BH CV ECHO MEAS - LVOT DIAM: 2 CM
BH CV ECHO MEAS - LVPWD: 1.1 CM
BH CV ECHO MEAS - LVPWS: 1.3 CM
BH CV ECHO MEAS - MV A MAX VEL: 91.8 CM/SEC
BH CV ECHO MEAS - MV E MAX VEL: 65.6 CM/SEC
BH CV ECHO MEAS - MV E/A: 0.72
BH CV ECHO MEAS - PA ACC SLOPE: 1638 CM/SEC^2
BH CV ECHO MEAS - PA ACC TIME: 0.06 SEC
BH CV ECHO MEAS - PA PR(ACCEL): 53.6 MMHG
BH CV ECHO MEAS - RAP SYSTOLE: 10 MMHG
BH CV ECHO MEAS - RVDD: 0.4 CM
BH CV ECHO MEAS - RVSP: 25.7 MMHG
BH CV ECHO MEAS - SI(AO): 126.8 ML/M^2
BH CV ECHO MEAS - SI(CUBED): 31.2 ML/M^2
BH CV ECHO MEAS - SI(MOD-SP4): 13.8 ML/M^2
BH CV ECHO MEAS - SI(TEICH): 29.1 ML/M^2
BH CV ECHO MEAS - SV(AO): 202 ML
BH CV ECHO MEAS - SV(CUBED): 49.7 ML
BH CV ECHO MEAS - SV(MOD-SP4): 22 ML
BH CV ECHO MEAS - SV(TEICH): 46.3 ML
BH CV ECHO MEAS - TR MAX VEL: 198.2 CM/SEC
BILIRUB SERPL-MCNC: 0.6 MG/DL (ref 0.2–1.8)
BNP SERPL-MCNC: 52 PG/ML (ref 0–100)
BUN BLD-MCNC: 19 MG/DL (ref 7–21)
BUN/CREAT SERPL: 23.2 (ref 7–25)
CALCIUM SPEC-SCNC: 9.6 MG/DL (ref 7.7–10)
CHLORIDE SERPL-SCNC: 110 MMOL/L (ref 99–112)
CK MB SERPL-CCNC: 0.41 NG/ML (ref 0–5)
CK MB SERPL-RTO: 1 % (ref 0–3)
CK SERPL-CCNC: 43 U/L (ref 24–173)
CO2 SERPL-SCNC: 25.5 MMOL/L (ref 24.3–31.9)
CREAT BLD-MCNC: 0.82 MG/DL (ref 0.43–1.29)
DEPRECATED RDW RBC AUTO: 39.8 FL (ref 37–54)
EOSINOPHIL # BLD AUTO: 0.17 10*3/MM3 (ref 0–0.7)
EOSINOPHIL NFR BLD AUTO: 2.9 % (ref 0–7)
ERYTHROCYTE [DISTWIDTH] IN BLOOD BY AUTOMATED COUNT: 13 % (ref 11.5–14.5)
GFR SERPL CREATININE-BSD FRML MDRD: 68 ML/MIN/1.73
GLOBULIN UR ELPH-MCNC: 2.7 GM/DL
GLUCOSE BLD-MCNC: 80 MG/DL (ref 70–110)
HCT VFR BLD AUTO: 41.7 % (ref 37–47)
HGB BLD-MCNC: 14.3 G/DL (ref 12–16)
IMM GRANULOCYTES # BLD: 0.01 10*3/MM3 (ref 0–0.03)
IMM GRANULOCYTES NFR BLD: 0.2 % (ref 0–0.5)
INR PPP: 1.04 (ref 0.9–1.1)
LIPASE SERPL-CCNC: 53 U/L (ref 13–60)
LYMPHOCYTES # BLD AUTO: 1.8 10*3/MM3 (ref 1–3)
LYMPHOCYTES NFR BLD AUTO: 30.8 % (ref 16–46)
MCH RBC QN AUTO: 29.2 PG (ref 27–33)
MCHC RBC AUTO-ENTMCNC: 34.3 G/DL (ref 33–37)
MCV RBC AUTO: 85.3 FL (ref 80–94)
MONOCYTES # BLD AUTO: 0.45 10*3/MM3 (ref 0.1–0.9)
MONOCYTES NFR BLD AUTO: 7.7 % (ref 0–12)
NEUTROPHILS # BLD AUTO: 3.39 10*3/MM3 (ref 1.4–6.5)
NEUTROPHILS NFR BLD AUTO: 57.9 % (ref 40–75)
OSMOLALITY SERPL CALC.SUM OF ELEC: 284.4 MOSM/KG (ref 273–305)
PLATELET # BLD AUTO: 185 10*3/MM3 (ref 130–400)
PMV BLD AUTO: 11.1 FL (ref 6–10)
POTASSIUM BLD-SCNC: 3.8 MMOL/L (ref 3.5–5.3)
PROT SERPL-MCNC: 7.2 G/DL (ref 6–8)
PROTHROMBIN TIME: 13.8 SECONDS (ref 11–15.4)
RBC # BLD AUTO: 4.89 10*6/MM3 (ref 4.2–5.4)
SODIUM BLD-SCNC: 142 MMOL/L (ref 135–153)
TROPONIN I SERPL-MCNC: 0.01 NG/ML
TROPONIN I SERPL-MCNC: <0.006 NG/ML
WBC NRBC COR # BLD: 5.85 10*3/MM3 (ref 4.5–12.5)

## 2018-07-08 PROCEDURE — G0378 HOSPITAL OBSERVATION PER HR: HCPCS

## 2018-07-08 PROCEDURE — 85610 PROTHROMBIN TIME: CPT | Performed by: PHYSICIAN ASSISTANT

## 2018-07-08 PROCEDURE — 71045 X-RAY EXAM CHEST 1 VIEW: CPT | Performed by: RADIOLOGY

## 2018-07-08 PROCEDURE — 82150 ASSAY OF AMYLASE: CPT | Performed by: PHYSICIAN ASSISTANT

## 2018-07-08 PROCEDURE — 84484 ASSAY OF TROPONIN QUANT: CPT | Performed by: PHYSICIAN ASSISTANT

## 2018-07-08 PROCEDURE — 83880 ASSAY OF NATRIURETIC PEPTIDE: CPT | Performed by: PHYSICIAN ASSISTANT

## 2018-07-08 PROCEDURE — 93306 TTE W/DOPPLER COMPLETE: CPT

## 2018-07-08 PROCEDURE — 84484 ASSAY OF TROPONIN QUANT: CPT | Performed by: INTERNAL MEDICINE

## 2018-07-08 PROCEDURE — 80053 COMPREHEN METABOLIC PANEL: CPT | Performed by: PHYSICIAN ASSISTANT

## 2018-07-08 PROCEDURE — 25010000002 ENOXAPARIN PER 10 MG: Performed by: INTERNAL MEDICINE

## 2018-07-08 PROCEDURE — 82550 ASSAY OF CK (CPK): CPT | Performed by: INTERNAL MEDICINE

## 2018-07-08 PROCEDURE — 83690 ASSAY OF LIPASE: CPT | Performed by: PHYSICIAN ASSISTANT

## 2018-07-08 PROCEDURE — 82553 CREATINE MB FRACTION: CPT | Performed by: INTERNAL MEDICINE

## 2018-07-08 PROCEDURE — 93010 ELECTROCARDIOGRAM REPORT: CPT | Performed by: INTERNAL MEDICINE

## 2018-07-08 PROCEDURE — 85730 THROMBOPLASTIN TIME PARTIAL: CPT | Performed by: PHYSICIAN ASSISTANT

## 2018-07-08 PROCEDURE — 93005 ELECTROCARDIOGRAM TRACING: CPT | Performed by: INTERNAL MEDICINE

## 2018-07-08 PROCEDURE — 93005 ELECTROCARDIOGRAM TRACING: CPT | Performed by: PHYSICIAN ASSISTANT

## 2018-07-08 PROCEDURE — 96372 THER/PROPH/DIAG INJ SC/IM: CPT

## 2018-07-08 PROCEDURE — 99285 EMERGENCY DEPT VISIT HI MDM: CPT

## 2018-07-08 PROCEDURE — 85025 COMPLETE CBC W/AUTO DIFF WBC: CPT | Performed by: PHYSICIAN ASSISTANT

## 2018-07-08 PROCEDURE — 71045 X-RAY EXAM CHEST 1 VIEW: CPT

## 2018-07-08 RX ORDER — SODIUM CHLORIDE 9 MG/ML
75 INJECTION, SOLUTION INTRAVENOUS CONTINUOUS
Status: DISCONTINUED | OUTPATIENT
Start: 2018-07-08 | End: 2018-07-09

## 2018-07-08 RX ORDER — UBIDECARENONE 100 MG
100 CAPSULE ORAL DAILY
COMMUNITY
End: 2022-03-24

## 2018-07-08 RX ORDER — PANTOPRAZOLE SODIUM 40 MG/1
40 TABLET, DELAYED RELEASE ORAL EVERY MORNING
Status: DISCONTINUED | OUTPATIENT
Start: 2018-07-09 | End: 2018-07-10 | Stop reason: HOSPADM

## 2018-07-08 RX ORDER — ATORVASTATIN CALCIUM 10 MG/1
10 TABLET, FILM COATED ORAL NIGHTLY
Status: DISCONTINUED | OUTPATIENT
Start: 2018-07-08 | End: 2018-07-10 | Stop reason: HOSPADM

## 2018-07-08 RX ORDER — ATORVASTATIN CALCIUM 10 MG/1
10 TABLET, FILM COATED ORAL DAILY
Status: CANCELLED | OUTPATIENT
Start: 2018-07-08

## 2018-07-08 RX ORDER — ACETAMINOPHEN 325 MG/1
650 TABLET ORAL EVERY 6 HOURS PRN
Status: DISCONTINUED | OUTPATIENT
Start: 2018-07-08 | End: 2018-07-10 | Stop reason: HOSPADM

## 2018-07-08 RX ORDER — FAMOTIDINE 20 MG/1
20 TABLET, FILM COATED ORAL DAILY
Status: DISCONTINUED | OUTPATIENT
Start: 2018-07-08 | End: 2018-07-10

## 2018-07-08 RX ORDER — ACETAMINOPHEN 325 MG/1
TABLET ORAL
Status: COMPLETED
Start: 2018-07-08 | End: 2018-07-08

## 2018-07-08 RX ORDER — ASPIRIN 81 MG/1
324 TABLET, CHEWABLE ORAL ONCE
Status: COMPLETED | OUTPATIENT
Start: 2018-07-08 | End: 2018-07-08

## 2018-07-08 RX ORDER — NITROGLYCERIN 0.4 MG/1
0.4 TABLET SUBLINGUAL
Status: DISCONTINUED | OUTPATIENT
Start: 2018-07-08 | End: 2018-07-10 | Stop reason: HOSPADM

## 2018-07-08 RX ORDER — ASPIRIN 81 MG/1
81 TABLET ORAL DAILY
Status: DISCONTINUED | OUTPATIENT
Start: 2018-07-08 | End: 2018-07-10 | Stop reason: HOSPADM

## 2018-07-08 RX ORDER — UBIDECARENONE 100 MG
200 CAPSULE ORAL DAILY
Status: CANCELLED | OUTPATIENT
Start: 2018-07-08

## 2018-07-08 RX ORDER — SODIUM CHLORIDE 0.9 % (FLUSH) 0.9 %
10 SYRINGE (ML) INJECTION AS NEEDED
Status: DISCONTINUED | OUTPATIENT
Start: 2018-07-08 | End: 2018-07-10 | Stop reason: HOSPADM

## 2018-07-08 RX ORDER — OMEPRAZOLE 40 MG/1
40 CAPSULE, DELAYED RELEASE ORAL DAILY
Status: ON HOLD | COMMUNITY
End: 2018-07-08

## 2018-07-08 RX ORDER — PANTOPRAZOLE SODIUM 40 MG/1
40 TABLET, DELAYED RELEASE ORAL
Status: CANCELLED | OUTPATIENT
Start: 2018-07-08

## 2018-07-08 RX ADMIN — SODIUM CHLORIDE 75 ML/HR: 9 INJECTION, SOLUTION INTRAVENOUS at 17:29

## 2018-07-08 RX ADMIN — ACETAMINOPHEN 650 MG: 325 TABLET ORAL at 23:07

## 2018-07-08 RX ADMIN — ASPIRIN 324 MG: 81 TABLET, CHEWABLE ORAL at 11:19

## 2018-07-08 RX ADMIN — NITROGLYCERIN 1 INCH: 20 OINTMENT TOPICAL at 11:41

## 2018-07-08 RX ADMIN — METOPROLOL TARTRATE 12.5 MG: 25 TABLET, FILM COATED ORAL at 14:44

## 2018-07-08 RX ADMIN — NITROGLYCERIN 0.4 MG: 0.4 TABLET SUBLINGUAL at 11:27

## 2018-07-08 RX ADMIN — ASPIRIN 81 MG: 81 TABLET ORAL at 14:44

## 2018-07-08 RX ADMIN — SODIUM CHLORIDE 75 ML/HR: 9 INJECTION, SOLUTION INTRAVENOUS at 11:19

## 2018-07-08 RX ADMIN — ATORVASTATIN CALCIUM 10 MG: 10 TABLET, FILM COATED ORAL at 20:53

## 2018-07-08 RX ADMIN — ACETAMINOPHEN 650 MG: 325 TABLET, FILM COATED ORAL at 23:07

## 2018-07-08 RX ADMIN — FAMOTIDINE 20 MG: 20 TABLET, FILM COATED ORAL at 14:44

## 2018-07-08 RX ADMIN — NITROGLYCERIN 0.4 MG: 0.4 TABLET SUBLINGUAL at 11:20

## 2018-07-08 RX ADMIN — ENOXAPARIN SODIUM 40 MG: 40 INJECTION, SOLUTION INTRAVENOUS; SUBCUTANEOUS at 20:55

## 2018-07-08 NOTE — H&P
Primary care provider: Dr. Bouchra Jane    Admitting physician-Dr. Gates    Chief complaint:    Chest edward    History of present illness:      72-year-old woman with no significant past cardiac illness has been admitted with history of chest pain.    Chest pain-  started this morning when she was eating, pressure-like, it was intense at the time of onset, has been constant since then with variable intensity, with no definite aggravating or relieving factors, nonexertional, with radiation to left arm and not associated with diaphoresis.  Cardiac markers ×1 negative for MI.  ECG showed normal sinus rhythm, isolated monomorphic PVCs and nonspecific ST-T changes.  Patient has been admitted to a telemetry bed for further evaluation and management.    Her effort tolerance has been normal.  She does activity of daily life without experiencing shortness of breath.  No history of PND or orthopnea.  No history of dizziness.  History of infrequent palpitations.    No history of known cardiac illness.  No history of prior MI, known coronary artery disease, CHF or cardiac arrhythmia.     She has history of hyperlipidemia and takes lovastatin and has Sapp's esophagus per EGD and takes Protonix.    Cardiac risk factors-her age and hyperlipidemia.  No history of hypertension, diabetes mellitus or smoking.        Review of Systems    Constitutional-none  ENT-none  Cardiovascular-as above  Respiratory-none  GI-acid reflux and Sapp's esophagus  Endocrine-lipid disorder and thyroid disorder  Musculoskeletal-arthritis  Other organ system involvement-negative    History  Past Medical History:   Diagnosis Date   • Arthritis    • Disease of thyroid gland    • Elevated cholesterol    • Fracture of right distal radius    • GERD (gastroesophageal reflux disease)    • History of transfusion    • Hypercholesteremia    , Past Surgical History:   Procedure Laterality Date   • APPENDECTOMY     • CHOLECYSTECTOMY     • COLONOSCOPY     •  "COLONOSCOPY N/A 5/15/2018    Procedure: COLONOSCOPY  CPTCODE:81666;  Surgeon: Wade Ramos III, MD;  Location: [facility] OR;  Service: Gastroenterology   • ENDOSCOPY     • ENDOSCOPY N/A 5/15/2018    Procedure: ESOPHAGOGASTRODUODENOSCOPY WITH BIOPSY  CPTCODE:39031;  Surgeon: Wade Ramos III, MD;  Location: [facility] OR;  Service: Gastroenterology   • HYSTERECTOMY     • JOINT REPLACEMENT        hips    both   • ORIF WRIST FRACTURE Right 8/24/2016    Procedure: WRIST OPEN REDUCTION INTERNAL FIXATION;  Surgeon: Shadi Womack MD;  Location: [facility] OR;  Service:    • THYROID SURGERY  2002    thyroidectomy w/ metal clips   • TOTAL HIP ARTHROPLASTY      bilateral    , Family History   Problem Relation Age of Onset   • Hyperlipidemia Mother    • Hyperlipidemia Father    • Breast cancer Neg Hx    , Social History   Substance Use Topics   • Smoking status: Never Smoker   • Smokeless tobacco: Never Used   • Alcohol use No        Medication Review:  She takes Protonix 40 mg by mouth daily and lovastatin 20 mg by mouth daily at home.      Allergies:  Codeine and Contrast dye     Social history-no history of smoking on call or substance abuse    Family history-noncontributory    Objective        Vital Sign Min/Max for last 24 hours  Temp  Min: 96 °F (35.6 °C)  Max: 98.2 °F (36.8 °C)   BP  Min: 104/63  Max: 177/85   Pulse  Min: 64  Max: 90   Resp  Min: 18  Max: 20   SpO2  Min: 92 %  Max: 100 %   No Data Recorded   Weight  Min: 61.1 kg (134 lb 12.8 oz)  Max: 61.2 kg (135 lb)     Flowsheet Rows      First Filed Value   Admission Height  154.9 cm (61\") Documented at 07/08/2018 1041   Admission Weight  61.2 kg (135 lb) Documented at 07/08/2018 1041             Physical Exam:     General Appearance:    Alert, cooperative, in no acute distress   Head:    Normocephalic, without obvious abnormality, atraumatic   Eyes:            Lids and lashes normal, conjunctivae and sclerae normal, no   icterus, no pallor, corneas " clear, PERRLA   Ears:    Ears appear intact with no abnormalities noted   Throat:   No oral lesions, no thrush, oral mucosa moist   Neck:   No adenopathy, supple, trachea midline, no thyromegaly, no   carotid bruit, no JVD   Back:     No kyphosis present, no scoliosis present, no skin lesions,      erythema or scars, no tenderness to percussion or                   palpation,   range of motion normal   Lungs:     Clear to auscultation,respirations regular, even and                  unlabored    Heart:    Regular rhythm and normal rate, normal S1 and S2, no            murmur, no gallop, no rub, no click   Chest Wall:    No abnormalities observed   Abdomen:     Normal bowel sounds, no masses, no organomegaly, soft        non-tender, non-distended, no guarding, no rebound                tenderness   Rectal:     Deferred   Extremities: No pedal edema    Pulses:   Pulses palpable and equal bilaterally   Skin:   No bleeding, bruising or rash       Neurologic:   Cranial nerves 2 - 12 grossly intact, sensation intact, DTR       present and equal bilaterally       Telemetry  Normal sinus rhythm and PVCs    ECG  ECG/EMG Results (last 24 hours)     Procedure Component Value Units Date/Time    ECG 12 Lead [[id number]] Collected:  07/08/18 1038     Updated:  07/08/18 1347    Narrative:       Test Reason : chest pain  Blood Pressure : **/** mmHG  Vent. Rate : 083 BPM     Atrial Rate : 083 BPM     P-R Int : 172 ms          QRS Dur : 080 ms      QT Int : 366 ms       P-R-T Axes : 044 -43 057 degrees     QTc Int : 430 ms    Sinus rhythm with frequent premature ventricular complexes  Left axis deviation  Minimal voltage criteria for LVH, may be normal variant  Nonspecific ST abnormality  Abnormal ECG  No previous ECGs available  Confirmed by Jigar Gates ([id number]) on 7/8/2018 1:47:26 PM    Referred By:  RODRI           Confirmed By:Jigar Gates            Labs    Results from last 7 days  Lab Units 07/08/18  1111   WBC 10*3/mm3  5.85   HEMOGLOBIN g/dL 14.3   HEMATOCRIT % 41.7   PLATELETS 10*3/mm3 185       Results from last 7 days  Lab Units 07/08/18  1111   SODIUM mmol/L 142   POTASSIUM mmol/L 3.8   CHLORIDE mmol/L 110   CO2 mmol/L 25.5   BUN mg/dL 19   CREATININE mg/dL 0.82   CALCIUM mg/dL 9.6   GLUCOSE mg/dL 80       Results from last 7 days  Lab Units 07/08/18  1111   BILIRUBIN mg/dL 0.6   ALK PHOS U/L 110*   AST (SGOT) U/L 23   ALT (SGPT) U/L 21           Results from last 7 days  Lab Units 07/08/18  1111   INR  1.04           Results from last 7 days  Lab Units 07/08/18  1111   TROPONIN I ng/mL <0.006           Imaging  @[id number]        Assessment     1.  Chest pain.  Most of the features are atypical for angina.  Rule out MI.  2.  Hyperlipidemia  3.  PVCs- occasional asymptomatic.  4.  GERD and Sapp's esophagus  5.   Thyroid nodule status post partial thyroidectomy.    Plan    1.  Patient has been admitted to a telemetry bed  2.  Obtain serial cardiac markers and ECGs  3.  Medications-aspirin 81 mg by mouth daily, transdermal nitrate 2% half inch every 8 hours and metoprolol 12.5 mg by mouth twice a day  4.  Resume-statin and Protonix  4.  Echocardiogram to be done to evaluate left ventricular function and wall motion  5.  If MI is ruled out, patient will be scheduled for a Lexiscan myocardial perfusion study for ischemia evaluation  6.  DVT prophylaxis-Lovenox 40 mg subcutaneous daily  9.  Resuscitation status-full code  10.  I discussed her condition and plan of care with the patient and .        Jigar Gates MD  07/08/18  2:35 PM     Cc: Dr. Bouchra Jane

## 2018-07-08 NOTE — PLAN OF CARE
Problem: Fall Risk (Adult)  Goal: Identify Related Risk Factors and Signs and Symptoms  Outcome: Outcome(s) achieved Date Met: 07/08/18    Goal: Absence of Fall  Outcome: Ongoing (interventions implemented as appropriate)

## 2018-07-08 NOTE — PLAN OF CARE
Problem: Cardiac: ACS (Acute Coronary Syndrome) (Adult)  Goal: Signs and Symptoms of Listed Potential Problems Will be Absent, Minimized or Managed (Cardiac: ACS)  Outcome: Outcome(s) achieved Date Met: 07/08/18

## 2018-07-08 NOTE — ED PROVIDER NOTES
Subjective   72-year-old female presents to the ED today for chest pain.  She states it started approximately 2 hours ago.  She describes it as a pressure in the left side of her chest.  She states it radiates down to the left elbow.  She has had shortness of breath with it.  She denies any nausea or vomiting.  She states it is currently an 8 out of 10.  She denies any diaphoresis.  She denies any lower extremity edema.  She denies any past history of coronary artery disease.  She states she has had a normal stress test in the past but it was many years ago.  She states she has never had a heart cath.  She does not follow with cardiology.  She denies any known family history of coronary artery disease.  She states she does have a history of high cholesterol.  She has never been a smoker.  She states the symptoms started at rest.  She states she had finished breakfast and was getting ready to get ready for her day when she had a sudden onset of the chest pressure.        History provided by:  Patient  Chest Pain   Pain location:  L chest  Pain quality: pressure    Pain radiates to:  L arm  Pain severity:  Severe  Onset quality:  Sudden  Duration:  2 hours  Timing:  Constant  Progression:  Worsening  Chronicity:  New  Context: at rest    Relieved by:  Nothing  Worsened by:  Nothing  Ineffective treatments:  None tried  Associated symptoms: shortness of breath    Associated symptoms: no abdominal pain, no altered mental status, no anorexia, no anxiety, no back pain, no claudication, no cough, no diaphoresis, no dizziness, no dysphagia, no fatigue, no fever, no headache, no heartburn, no lower extremity edema, no nausea, no near-syncope, no numbness, no orthopnea, no palpitations, no PND, no syncope, no vomiting and no weakness    Risk factors: high cholesterol    Risk factors: no coronary artery disease, no diabetes mellitus, no hypertension, not obese and no smoking        Review of Systems   Constitutional: Negative  for diaphoresis, fatigue and fever.   HENT: Negative for trouble swallowing.    Eyes: Negative.    Respiratory: Positive for shortness of breath. Negative for cough, chest tightness and wheezing.    Cardiovascular: Positive for chest pain. Negative for palpitations, orthopnea, claudication, leg swelling, syncope, PND and near-syncope.   Gastrointestinal: Negative for abdominal pain, anorexia, heartburn, nausea and vomiting.   Genitourinary: Negative.    Musculoskeletal: Negative for back pain.   Skin: Negative.    Neurological: Negative for dizziness, weakness, numbness and headaches.   Psychiatric/Behavioral: Negative.    All other systems reviewed and are negative.      Past Medical History:   Diagnosis Date   • Arthritis    • Disease of thyroid gland    • Elevated cholesterol    • Fracture of right distal radius    • GERD (gastroesophageal reflux disease)    • History of transfusion    • Hypercholesteremia        Allergies   Allergen Reactions   • Codeine Nausea And Vomiting   • Contrast Dye Nausea And Vomiting     Patient states not true allergy. Has had contrast since then and was fine.       Past Surgical History:   Procedure Laterality Date   • APPENDECTOMY     • CHOLECYSTECTOMY     • COLONOSCOPY     • COLONOSCOPY N/A 5/15/2018    Procedure: COLONOSCOPY  CPTCODE:30955;  Surgeon: Wade Ramos III, MD;  [facility]: [facility];  Service: Gastroenterology   • ENDOSCOPY     • ENDOSCOPY N/A 5/15/2018    Procedure: ESOPHAGOGASTRODUODENOSCOPY WITH BIOPSY  CPTCODE:27172;  Surgeon: Wade Ramos III, MD;  Location: [facility] OR;  Service: Gastroenterology   • HYSTERECTOMY     • JOINT REPLACEMENT        hips    both   • ORIF WRIST FRACTURE Right 8/24/2016    Procedure: WRIST OPEN REDUCTION INTERNAL FIXATION;  Surgeon: Shadi Womack MD;  Location: [facility] OR;  Service:    • THYROID SURGERY  2002    thyroidectomy w/ metal clips   • TOTAL HIP ARTHROPLASTY      bilateral        Family History   Problem  Relation Age of Onset   • Hyperlipidemia Mother    • Hyperlipidemia Father    • Breast cancer Neg Hx        Social History     Social History   • Marital status:      Social History Main Topics   • Smoking status: Never Smoker   • Smokeless tobacco: Never Used   • Alcohol use No   • Drug use: No   • Sexual activity: Defer     Other Topics Concern   • Not on file           Objective   Physical Exam   Constitutional: She is oriented to person, place, and time. She appears well-developed and well-nourished. No distress.   HENT:   Head: Normocephalic and atraumatic.   Right Ear: External ear normal.   Left Ear: External ear normal.   Nose: Nose normal.   Mouth/Throat: Oropharynx is clear and moist.   Eyes: Conjunctivae and EOM are normal. Pupils are equal, round, and reactive to light.   Neck: Normal range of motion. Neck supple.   Cardiovascular: Normal rate, regular rhythm, normal heart sounds and intact distal pulses.    Pulmonary/Chest: Effort normal and breath sounds normal. No respiratory distress. She has no wheezes. She exhibits no tenderness.   Abdominal: Soft. Bowel sounds are normal. There is no tenderness. There is no rebound and no guarding.   Musculoskeletal: Normal range of motion. She exhibits no edema.   Neurological: She is alert and oriented to person, place, and time.   Skin: Skin is warm and dry. Capillary refill takes less than 2 seconds.   Psychiatric: She has a normal mood and affect. Her behavior is normal. Judgment and thought content normal.   Nursing note and vitals reviewed.      Procedures           ED Course  ED Course as of Jul 08 1450   Sun Jul 08, 2018   1046 10:38 - sinus rhythm with frequent PVC's, rate of 83, LAD, nonspecific ST abnormality, no acute ischemia, reviewed by Dr. Ornelas ECG 12 Lead [AH]   1151 Patient's pain improved with the SL Nitro, it went from an 8/10 to a 3/10 after the 2nd nitro.  She declined the 3rd nitro and an inch of nitro paste was placed.  [AH]    4933 I have discussed with Dr. Gates who will admit to telemetry observation.  [AH]      ED Course User Index  [] EVA Garcia                HEART Score (for prediction of 6-week risk of major adverse cardiac event) reviewed and/or performed as part of the patient evaluation and treatment planning process.  The result associated with this review/performance is: 5       MDM  Number of Diagnoses or Management Options     Amount and/or Complexity of Data Reviewed  Clinical lab tests: reviewed  Tests in the radiology section of CPT®: reviewed  Tests in the medicine section of CPT®: reviewed  Discuss the patient with other providers: yes    Patient Progress  Patient progress: stable        Final diagnoses:   Chest pain, unspecified type            EVA Garcia  07/08/18 7074

## 2018-07-09 ENCOUNTER — APPOINTMENT (OUTPATIENT)
Dept: CARDIOLOGY | Facility: HOSPITAL | Age: 73
End: 2018-07-09

## 2018-07-09 ENCOUNTER — APPOINTMENT (OUTPATIENT)
Dept: NUCLEAR MEDICINE | Facility: HOSPITAL | Age: 73
End: 2018-07-09

## 2018-07-09 LAB
CHOLEST SERPL-MCNC: 168 MG/DL (ref 0–200)
GLUCOSE BLDC GLUCOMTR-MCNC: 92 MG/DL (ref 70–130)
HDLC SERPL-MCNC: 56 MG/DL (ref 60–100)
LDLC SERPL CALC-MCNC: 78 MG/DL (ref 0–100)
LDLC/HDLC SERPL: 1.39 {RATIO}
TRIGL SERPL-MCNC: 171 MG/DL (ref 0–150)
TROPONIN I SERPL-MCNC: <0.006 NG/ML
VLDLC SERPL-MCNC: 34.2 MG/DL

## 2018-07-09 PROCEDURE — G0378 HOSPITAL OBSERVATION PER HR: HCPCS

## 2018-07-09 PROCEDURE — 0 TECHNETIUM SESTAMIBI: Performed by: INTERNAL MEDICINE

## 2018-07-09 PROCEDURE — 93017 CV STRESS TEST TRACING ONLY: CPT

## 2018-07-09 PROCEDURE — 25010000002 ENOXAPARIN PER 10 MG: Performed by: INTERNAL MEDICINE

## 2018-07-09 PROCEDURE — 25010000002 REGADENOSON 0.4 MG/5ML SOLUTION: Performed by: INTERNAL MEDICINE

## 2018-07-09 PROCEDURE — 80061 LIPID PANEL: CPT | Performed by: INTERNAL MEDICINE

## 2018-07-09 PROCEDURE — 96372 THER/PROPH/DIAG INJ SC/IM: CPT

## 2018-07-09 PROCEDURE — A9500 TC99M SESTAMIBI: HCPCS | Performed by: INTERNAL MEDICINE

## 2018-07-09 PROCEDURE — 78452 HT MUSCLE IMAGE SPECT MULT: CPT

## 2018-07-09 PROCEDURE — 82962 GLUCOSE BLOOD TEST: CPT

## 2018-07-09 PROCEDURE — 84484 ASSAY OF TROPONIN QUANT: CPT | Performed by: INTERNAL MEDICINE

## 2018-07-09 RX ADMIN — NITROGLYCERIN 0.5 INCH: 20 OINTMENT TOPICAL at 13:33

## 2018-07-09 RX ADMIN — METOPROLOL TARTRATE 12.5 MG: 25 TABLET, FILM COATED ORAL at 20:04

## 2018-07-09 RX ADMIN — ATORVASTATIN CALCIUM 10 MG: 10 TABLET, FILM COATED ORAL at 20:04

## 2018-07-09 RX ADMIN — TECHNETIUM TC 99M SESTAMIBI 1 DOSE: 1 INJECTION INTRAVENOUS at 12:10

## 2018-07-09 RX ADMIN — NITROGLYCERIN 0.5 INCH: 20 OINTMENT TOPICAL at 17:07

## 2018-07-09 RX ADMIN — TECHNETIUM TC 99M SESTAMIBI 1 DOSE: 1 INJECTION INTRAVENOUS at 10:05

## 2018-07-09 RX ADMIN — SODIUM CHLORIDE 75 ML/HR: 9 INJECTION, SOLUTION INTRAVENOUS at 05:23

## 2018-07-09 RX ADMIN — REGADENOSON 0.4 MG: 0.08 INJECTION, SOLUTION INTRAVENOUS at 11:40

## 2018-07-09 RX ADMIN — ENOXAPARIN SODIUM 40 MG: 40 INJECTION, SOLUTION INTRAVENOUS; SUBCUTANEOUS at 20:05

## 2018-07-09 NOTE — PLAN OF CARE
Problem: Fall Risk (Adult)  Goal: Absence of Fall  Outcome: Ongoing (interventions implemented as appropriate)      Problem: Patient Care Overview  Goal: Plan of Care Review  Outcome: Ongoing (interventions implemented as appropriate)

## 2018-07-09 NOTE — PROGRESS NOTES
"Reason for follow-up:  Chest pain and PVCs    Subjective:    Patient remained chest pain-free during hospital stay.  History of intermittent palpitations due to PVCs.  Cardiac markers-negative for MI.  Telemetry-sinus rhythm and frequent isolated PVCs.      Medication Review:     aspirin 81 mg Oral Daily   atorvastatin 10 mg Oral Nightly   enoxaparin 40 mg Subcutaneous Nightly   famotidine 20 mg Oral Daily   metoprolol tartrate 12.5 mg Oral Q12H   nitroglycerin 0.5 inch Topical Q6H   pantoprazole 40 mg Oral QAM         Vital Sign Min/Max for last 24 hours  Temp  Min: 96 °F (35.6 °C)  Max: 98.2 °F (36.8 °C)   BP  Min: 104/63  Max: 177/85   Pulse  Min: 55  Max: 90   Resp  Min: 18  Max: 20   SpO2  Min: 92 %  Max: 100 %   No Data Recorded   Weight  Min: 59.6 kg (131 lb 6.4 oz)  Max: 61.2 kg (135 lb)     Flowsheet Rows      First Filed Value   Admission Height  154.9 cm (61\") Documented at 07/08/2018 1041   Admission Weight  61.2 kg (135 lb) Documented at 07/08/2018 1041          Physical Exam:     General Appearance:    Alert, cooperative, in no acute distress   Head:    Normocephalic, without obvious abnormality, atraumatic   Eyes:            Lids and lashes normal, conjunctivae and sclerae normal, no   icterus, no pallor, corneas clear, PERRLA   Ears:    Ears appear intact with no abnormalities noted   Throat:   No oral lesions, no thrush, oral mucosa moist   Neck:   No adenopathy, supple, trachea midline, no thyromegaly, no   carotid bruit, no JVD   Back:     No kyphosis present, no scoliosis present, no skin lesions,      erythema or scars, no tenderness to percussion or                   palpation,   range of motion normal   Lungs:     Clear to auscultation,respirations regular, even and                  unlabored    Heart:    Regular rhythm and normal rate, normal S1 and S2, no            murmur, no gallop, no rub, no click   Chest Wall:    No abnormalities observed   Abdomen:     Normal bowel sounds, no masses, " no organomegaly, soft        non-tender, non-distended, no guarding, no rebound                tenderness   Rectal:     Deferred   Extremities: No pedal edema           Telemetry  Normal sinus rhythm and PVCs    Echocardiogram--    · Left ventricular systolic function is normal. Estimated EF appears to be in the range of 66 - 70%  · All left ventricular wall segments contract normally  · Left ventricular diastolic dysfunction (grade I) consistent with impaired relaxation.  · Normal cardiac chamber dimensions.  · Mild aortic valve regurgitation is present.  · Mild tricuspid valve regurgitation is present. No evidence of pulmonary hypertension is present  · The mitral and pulmonic valves appear normal in structure and showed no regurgitations  · There is no evidence of pericardial effusion.         Labs    Results from last 7 days  Lab Units 07/08/18  1111   WBC 10*3/mm3 5.85   HEMOGLOBIN g/dL 14.3   HEMATOCRIT % 41.7   PLATELETS 10*3/mm3 185       Results from last 7 days  Lab Units 07/08/18  1111   SODIUM mmol/L 142   POTASSIUM mmol/L 3.8   CHLORIDE mmol/L 110   CO2 mmol/L 25.5   BUN mg/dL 19   CREATININE mg/dL 0.82   CALCIUM mg/dL 9.6   GLUCOSE mg/dL 80       Results from last 7 days  Lab Units 07/08/18  1111   BILIRUBIN mg/dL 0.6   ALK PHOS U/L 110*   AST (SGOT) U/L 23   ALT (SGPT) U/L 21           Results from last 7 days  Lab Units 07/08/18  1111   INR  1.04           Results from last 7 days  Lab Units 07/09/18  0535 07/08/18  2256 07/08/18  1750   CK TOTAL U/L  --   --  43   TROPONIN I ng/mL <0.006 <0.006 <0.006   CK MB INDEX %  --   --  1.0             Assessment    1.  Chest pain.  Most of the features are atypical for angina.  No MI.  2.  Hyperlipidemia  3.  PVCs- occasional asymptomatic.  4.  GERD and Sapp's esophagus  5.   Thyroid nodule status post partial thyroidectomy.       Plan    1.  Medications-continue aspirin, transdermal nitrate, metoprolol and atorvastatin  2.  Reviewed echocardiographic  study as above  3.  Scheduled for a nuclear stress test today for ischemia evaluation  4.  Discussed her condition and plan of care with the patient and patient's     .    Jigar Gates MD  07/09/18  10:03 AM

## 2018-07-09 NOTE — PLAN OF CARE
Problem: Cardiac: ACS (Acute Coronary Syndrome) (Adult)  Goal: Signs and Symptoms of Listed Potential Problems Will be Absent, Minimized or Managed (Cardiac: ACS)  Outcome: Ongoing (interventions implemented as appropriate)      Problem: Fall Risk (Adult)  Goal: Absence of Fall  Outcome: Ongoing (interventions implemented as appropriate)      Problem: Patient Care Overview  Goal: Plan of Care Review  Outcome: Ongoing (interventions implemented as appropriate)

## 2018-07-10 VITALS
RESPIRATION RATE: 18 BRPM | OXYGEN SATURATION: 98 % | BODY MASS INDEX: 24.58 KG/M2 | SYSTOLIC BLOOD PRESSURE: 155 MMHG | HEART RATE: 63 BPM | TEMPERATURE: 98.1 F | DIASTOLIC BLOOD PRESSURE: 83 MMHG | HEIGHT: 61 IN | WEIGHT: 130.2 LBS

## 2018-07-10 LAB
BH CV NUCLEAR PRIOR STUDY: 3
BH CV STRESS BP STAGE 1: NORMAL
BH CV STRESS BP STAGE 2: NORMAL
BH CV STRESS COMMENTS STAGE 1: NORMAL
BH CV STRESS COMMENTS STAGE 2: NORMAL
BH CV STRESS DOSE REGADENOSON STAGE 1: 0.4
BH CV STRESS DURATION MIN STAGE 1: 0
BH CV STRESS DURATION MIN STAGE 2: 4
BH CV STRESS DURATION SEC STAGE 1: 10
BH CV STRESS DURATION SEC STAGE 2: 0
BH CV STRESS HR STAGE 1: 102
BH CV STRESS HR STAGE 2: 83
BH CV STRESS PROTOCOL 1: NORMAL
BH CV STRESS RECOVERY BP: NORMAL MMHG
BH CV STRESS RECOVERY HR: 83 BPM
BH CV STRESS STAGE 1: 1
BH CV STRESS STAGE 2: 2
MAXIMAL PREDICTED HEART RATE: 147 BPM
PERCENT MAX PREDICTED HR: 69.39 %
STRESS BASELINE BP: NORMAL MMHG
STRESS BASELINE HR: 77 BPM
STRESS PERCENT HR: 82 %
STRESS POST PEAK BP: NORMAL MMHG
STRESS POST PEAK HR: 102 BPM
STRESS TARGET HR: 125 BPM

## 2018-07-10 PROCEDURE — G0378 HOSPITAL OBSERVATION PER HR: HCPCS

## 2018-07-10 RX ORDER — METOPROLOL SUCCINATE 25 MG/1
12.5 TABLET, EXTENDED RELEASE ORAL
Status: DISCONTINUED | OUTPATIENT
Start: 2018-07-10 | End: 2018-07-10 | Stop reason: HOSPADM

## 2018-07-10 RX ORDER — METOPROLOL SUCCINATE 25 MG/1
12.5 TABLET, EXTENDED RELEASE ORAL
Qty: 30 TABLET | Refills: 5 | Status: SHIPPED | OUTPATIENT
Start: 2018-07-10 | End: 2022-03-24

## 2018-07-10 RX ORDER — PANTOPRAZOLE SODIUM 40 MG/1
40 TABLET, DELAYED RELEASE ORAL
Status: DISCONTINUED | OUTPATIENT
Start: 2018-07-10 | End: 2018-07-10 | Stop reason: HOSPADM

## 2018-07-10 RX ADMIN — ASPIRIN 81 MG: 81 TABLET ORAL at 08:33

## 2018-07-10 RX ADMIN — PANTOPRAZOLE SODIUM 40 MG: 40 TABLET, DELAYED RELEASE ORAL at 05:30

## 2018-07-10 RX ADMIN — METOPROLOL SUCCINATE 12.5 MG: 25 TABLET, FILM COATED, EXTENDED RELEASE ORAL at 08:32

## 2018-07-10 RX ADMIN — PANTOPRAZOLE SODIUM 40 MG: 40 TABLET, DELAYED RELEASE ORAL at 08:33

## 2018-07-10 NOTE — PLAN OF CARE
Problem: Cardiac: ACS (Acute Coronary Syndrome) (Adult)  Goal: Signs and Symptoms of Listed Potential Problems Will be Absent, Minimized or Managed (Cardiac: ACS)  Outcome: Ongoing (interventions implemented as appropriate)      Problem: Fall Risk (Adult)  Goal: Absence of Fall  Outcome: Ongoing (interventions implemented as appropriate)

## 2018-07-10 NOTE — DISCHARGE SUMMARY
Date of Discharge:  7/10/2018    Discharge Diagnosis:   Principal diagnosis-  Noncardiac chest pain    Secondary diagnosis-  GERD  Premature ventricular contraction  Sapp's esophagus  Hyperlipidemia    Reason for admission  Chest pain [R07.9]        Hospital Course    72-year-old woman with no significant past cardiac illness has been admitted with history of chest pain.     Chest pain-  started this morning when she was eating, pressure-like, it was intense at the time of onset, has been constant since then with variable intensity, with no definite aggravating or relieving factors, nonexertional, with radiation to left arm and not associated with diaphoresis.  Cardiac markers ×1 negative for MI.  ECG showed normal sinus rhythm, isolated monomorphic PVCs and nonspecific ST-T changes.  Patient has been admitted to a telemetry bed for further evaluation and management.     Her effort tolerance has been normal.  She does activity of daily life without experiencing shortness of breath.  No history of PND or orthopnea.  No history of dizziness.  History of infrequent palpitations.     No history of known cardiac illness.  No history of prior MI, known coronary artery disease, CHF or cardiac arrhythmia.      She has history of hyperlipidemia and takes lovastatin and has Sapp's esophagus per EGD and takes Protonix.     Cardiac risk factors-her age and hyperlipidemia.  No history of hypertension, diabetes mellitus or smoking    Patient was admitted to a telemetry bed.  She had infrequent isolated PVCs on telemetry monitoring.  Myocardial infarction was ruled out.  She remained chest pain-free.  Skin myocardial perfusion study showed no evidence of ischemia.  Echocardiogram showed normal left ventricular systolic function and wall motion and no significant valvular heart disease.  Her chest pain was considered noncardiac and related to underlying GERD.  Patient was reassured.  She had PVCs and symptomatic.   Infrequent.  No evidence of structural heart disease.  Started metoprolol XL 12.5 mg by mouth daily.  Frequency of PVCs has reduced significantly prior to discharge.  Her condition at the time of discharge was stable.    Procedures Performed     1.  Echocardiogram- showed normal left ventricular systolic function and wall motion.  No significant valvular heart disease.  2.  Nuclear stress test-showed no evidence of ischemia.  LVEF was normal.    Consults:   Consults     Date and Time Order Name Status Description    7/8/2018 1221 Cardiology (on-call MD unless specified)            Pertinent labs and imaging resulys:    Results from last 7 days  Lab Units 07/08/18  1111   WBC 10*3/mm3 5.85   HEMOGLOBIN g/dL 14.3   HEMATOCRIT % 41.7   PLATELETS 10*3/mm3 185       Results from last 7 days  Lab Units 07/08/18  1111   SODIUM mmol/L 142   POTASSIUM mmol/L 3.8   CHLORIDE mmol/L 110   CO2 mmol/L 25.5   BUN mg/dL 19   CREATININE mg/dL 0.82   CALCIUM mg/dL 9.6   GLUCOSE mg/dL 80       Results from last 7 days  Lab Units 07/08/18  1111   BILIRUBIN mg/dL 0.6   ALK PHOS U/L 110*   AST (SGOT) U/L 23   ALT (SGPT) U/L 21           Results from last 7 days  Lab Units 07/08/18  1111   INR  1.04           Results from last 7 days  Lab Units 07/09/18  0535 07/08/18  2256 07/08/18  1750   CK TOTAL U/L  --   --  43   TROPONIN I ng/mL <0.006 <0.006 <0.006   CK MB INDEX %  --   --  1.0             Imaging Results (last 72 hours)     Procedure Component Value Units Date/Time    XR Chest 1 View [[id number]] Collected:  07/08/18 1203     Updated:  07/08/18 1206    Narrative:       EXAMINATION: XR CHEST 1 VW-      CLINICAL INDICATION:     chest pressure, sob     TECHNIQUE:  XR CHEST 1 VW-      COMPARISON: NONE      FINDINGS:   The lungs remain aerated.  Heart and mediastinum contours are unremarkable.  No pleural effusion.  No pneumothorax.   Bony and soft tissue structures are unremarkable.       Impression:       No radiographic evidence of  acute cardiac or pulmonary  disease.     This report was finalized on 7/8/2018 12:03 PM by Dr. Calvin Worrell MD.               Condition on Discharge:  Stable        Discharge Disposition  Home or Self Care    Discharge Medications     Discharge Medications      New Medications      Instructions Start Date   metoprolol succinate XL 25 MG 24 hr tablet  Commonly known as:  TOPROL-XL   12.5 mg, Oral, Every 24 Hours Scheduled         Continue These Medications      Instructions Start Date   coenzyme Q10 100 MG capsule   100 mg, Oral, Daily      esomeprazole 40 MG capsule  Commonly known as:  nexIUM   40 mg, Oral, Every Morning Before Breakfast      lovastatin 40 MG tablet  Commonly known as:  MEVACOR   40 mg, Oral, Nightly             Discharge Diet:   Diet Instructions     Diet: Regular       Discharge Diet:  Regular          Activity at Discharge:   Activity Instructions     Activity as Tolerated             Follow-up Appointments  No future appointments.  Additional Instructions for the Follow-ups that You Need to Schedule     Discharge Follow-up with Specified Provider: Dr Bouchra Jane as scheduled 8.6/18    As directed      To:  Dr Bouchra Jane as scheduled 8.6/18    Follow Up Details:  Dr. Gates in 3-4 weeks                    Jigar Gates MD  07/10/18  8:21 AM

## 2018-07-12 ENCOUNTER — TELEPHONE (OUTPATIENT)
Dept: NEUROSURGERY | Facility: CLINIC | Age: 73
End: 2018-07-12

## 2018-07-12 DIAGNOSIS — D33.0 BENIGN NEOPLASM OF SUPRATENTORIAL REGION OF BRAIN (HCC): Primary | ICD-10-CM

## 2018-08-08 ENCOUNTER — APPOINTMENT (OUTPATIENT)
Dept: MRI IMAGING | Facility: HOSPITAL | Age: 73
End: 2018-08-08

## 2018-08-09 ENCOUNTER — TELEPHONE (OUTPATIENT)
Dept: UROLOGY | Facility: CLINIC | Age: 73
End: 2018-08-09

## 2018-08-09 NOTE — TELEPHONE ENCOUNTER
----- Message from Estella Kaur sent at 8/9/2018  8:19 AM EDT -----  Regarding: Non-Urgent Medical Question  Contact: [phone]  Hi, Just a question regarding my medication for stomach problems.  Should I continue taking this medication daily until I am supposed to have an EGD next year or stop to see how my problems feel by not taking this medication.  I just don't want to get into a problem as I have been hospitalized recently for PVC's and am on medication for that now.    Thank you.

## 2018-08-10 NOTE — TELEPHONE ENCOUNTER
She should be directed to continue PPI daily due to last EGD pathology results of probable Sapp's esophagus

## 2018-08-15 ENCOUNTER — HOSPITAL ENCOUNTER (OUTPATIENT)
Dept: MRI IMAGING | Facility: HOSPITAL | Age: 73
Discharge: HOME | End: 2018-08-15
Admitting: PHYSICIAN ASSISTANT

## 2018-08-15 DIAGNOSIS — D33.0 BENIGN NEOPLASM OF SUPRATENTORIAL REGION OF BRAIN (HCC): ICD-10-CM

## 2018-08-15 PROCEDURE — 70553 MRI BRAIN STEM W/O & W/DYE: CPT

## 2018-08-15 PROCEDURE — 70553 MRI BRAIN STEM W/O & W/DYE: CPT | Performed by: RADIOLOGY

## 2018-08-15 PROCEDURE — 0 GADOBENATE DIMEGLUMINE 529 MG/ML SOLUTION: Performed by: RADIOLOGY

## 2018-08-15 PROCEDURE — A9577 INJ MULTIHANCE: HCPCS | Performed by: RADIOLOGY

## 2018-08-15 RX ADMIN — GADOBENATE DIMEGLUMINE 12 ML: 529 INJECTION, SOLUTION INTRAVENOUS at 10:36

## 2018-08-16 ENCOUNTER — OFFICE VISIT (OUTPATIENT)
Dept: NEUROSURGERY | Facility: CLINIC | Age: 73
End: 2018-08-16

## 2018-08-16 VITALS
OXYGEN SATURATION: 98 % | HEART RATE: 68 BPM | HEIGHT: 61 IN | RESPIRATION RATE: 20 BRPM | BODY MASS INDEX: 26.06 KG/M2 | DIASTOLIC BLOOD PRESSURE: 80 MMHG | WEIGHT: 138 LBS | SYSTOLIC BLOOD PRESSURE: 142 MMHG | TEMPERATURE: 98.8 F

## 2018-08-16 DIAGNOSIS — D33.0 BENIGN NEOPLASM OF SUPRATENTORIAL REGION OF BRAIN (HCC): Primary | ICD-10-CM

## 2018-08-16 PROCEDURE — 99212 OFFICE O/P EST SF 10 MIN: CPT | Performed by: NEUROLOGICAL SURGERY

## 2018-08-16 NOTE — PROGRESS NOTES
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  She remains completely asymptomatic.  She reports now for follow-up.              Past Medical History:   Diagnosis Date   • Abnormal ECG    • Arthritis    • Disease of thyroid gland    • Elevated cholesterol    • Fracture of right distal radius    • GERD (gastroesophageal reflux disease)    • History of transfusion    • Hypercholesteremia               Past Surgical History:   Procedure Laterality Date   • APPENDECTOMY     • CHOLECYSTECTOMY     • COLONOSCOPY     • COLONOSCOPY N/A 5/15/2018    Procedure: COLONOSCOPY  CPTCODE:92703;  Surgeon: Wade Ramos III, MD;  Location: [facility];  Service: Gastroenterology   • ENDOSCOPY     • ENDOSCOPY N/A 5/15/2018    Procedure: ESOPHAGOGASTRODUODENOSCOPY WITH BIOPSY  CPTCODE:83816;  Surgeon: Wade Ramos III, MD;  Location: [facility] OR;  Service: Gastroenterology   • HYSTERECTOMY     • JOINT REPLACEMENT        hips    both   • ORIF WRIST FRACTURE Right 8/24/2016    Procedure: WRIST OPEN REDUCTION INTERNAL FIXATION;  Surgeon: Shadi Womack MD;  Location: [facility];  Service:    • THYROID SURGERY  2002    thyroidectomy w/ metal clips   • TOTAL HIP ARTHROPLASTY      bilateral               Family History   Problem Relation Age of Onset   • Hyperlipidemia Mother    • Hyperlipidemia Father    • Breast cancer Neg Hx               Social History     Social History   • Marital status:      Spouse name: N/A   • Number of children: N/A   • Years of education: N/A     Occupational History   • Not on file.     Social History Main Topics   • Smoking status: Never Smoker   • Smokeless tobacco: Never Used   • Alcohol use No   • Drug use: No   • Sexual activity: Defer     Other Topics Concern   •  "Not on file     Social History Narrative   • No narrative on file              Allergies   Allergen Reactions   • Codeine Nausea And Vomiting   • Contrast Dye Nausea And Vomiting     Patient states not true allergy. Has had contrast since then and was fine.              Current Outpatient Prescriptions:   •  coenzyme Q10 100 MG capsule, Take 100 mg by mouth Daily., Disp: , Rfl:   •  esomeprazole (nexIUM) 40 MG capsule, Take 1 capsule by mouth Every Morning Before Breakfast., Disp: 30 capsule, Rfl: 5  •  lovastatin (MEVACOR) 40 MG tablet, Take 40 mg by mouth Every Night., Disp: , Rfl:   •  metoprolol succinate XL (TOPROL-XL) 25 MG 24 hr tablet, Take 0.5 tablets by mouth Daily., Disp: 30 tablet, Rfl: 5         Review of Systems   Constitutional: Positive for fatigue. Negative for diaphoresis and fever.   Respiratory: Positive for shortness of breath.    Cardiovascular: Positive for chest pain and palpitations.   Gastrointestinal: Negative for abdominal pain, nausea and vomiting.   Musculoskeletal: Positive for arthralgias and myalgias. Negative for back pain and neck pain.   Allergic/Immunologic: Positive for environmental allergies.   Neurological: Positive for dizziness, light-headedness and headaches. Negative for syncope and weakness.   Psychiatric/Behavioral: Negative for confusion.   All other systems reviewed and are negative.              Vitals:    08/16/18 1332   BP: 142/80   BP Location: Left arm   Patient Position: Sitting   Pulse: 68   Resp: 20   Temp: 98.8 °F (37.1 °C)   SpO2: 98%   Weight: 62.6 kg (138 lb)   Height: 154.9 cm (61\")               EXAMINATION: Alert and oriented.  No papilledema.  No weakness sensory loss or reflex asymmetry.             MEDICAL DECISION MAKING: The MRI of the brain is unchanged.            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  She remains asymptomatic and I would expect that this will never caused her a problem.  I've told " her that should she develop headache or any other changes that would be suggestive of dysfunction she should call me.  These tumors can be resected but the indication for them is quite limited to those who are symptomatic.               I APPRECIATE THE OPPORTUNITY OF THIS REFERRAL. PLEASE CALL IF ANY       QUESTIONS [phone]    Answers for HPI/ROS submitted by the patient on 8/13/2018   Neurologic complaint  altered mental status: No  clumsiness: No  focal sensory loss: No  focal weakness: No  loss of balance: Yes  memory loss: No  near-syncope: No  slurred speech: No  visual change: No  Chronicity: chronic  Onset: more than 1 year ago  Onset quality: insidiously  Progression since onset: unchanged  Focality: no focality noted  auditory change: No  aura: No  bladder incontinence: No  bowel incontinence: No  vertigo: No  Treatments tried: position change  Improvement on treatment: moderate

## 2018-10-19 DIAGNOSIS — K21.9 GASTROESOPHAGEAL REFLUX DISEASE, ESOPHAGITIS PRESENCE NOT SPECIFIED: ICD-10-CM

## 2018-10-19 DIAGNOSIS — R19.8 ABNORMAL FINDINGS ON ESOPHAGOGASTRODUODENOSCOPY (EGD): ICD-10-CM

## 2018-10-22 RX ORDER — ESOMEPRAZOLE MAGNESIUM 40 MG/1
40 CAPSULE, DELAYED RELEASE ORAL
Qty: 30 CAPSULE | Refills: 11 | Status: SHIPPED | OUTPATIENT
Start: 2018-10-22

## 2018-12-17 ENCOUNTER — HOSPITAL ENCOUNTER (OUTPATIENT)
Dept: MAMMOGRAPHY | Facility: HOSPITAL | Age: 73
Discharge: HOME | End: 2018-12-17
Admitting: INTERNAL MEDICINE

## 2018-12-17 DIAGNOSIS — Z12.39 SCREENING BREAST EXAMINATION: ICD-10-CM

## 2018-12-17 PROCEDURE — 77063 BREAST TOMOSYNTHESIS BI: CPT | Performed by: RADIOLOGY

## 2018-12-17 PROCEDURE — 77067 SCR MAMMO BI INCL CAD: CPT | Performed by: RADIOLOGY

## 2018-12-17 PROCEDURE — 77067 SCR MAMMO BI INCL CAD: CPT

## 2018-12-17 PROCEDURE — 77063 BREAST TOMOSYNTHESIS BI: CPT

## 2019-12-18 ENCOUNTER — HOSPITAL ENCOUNTER (OUTPATIENT)
Dept: MAMMOGRAPHY | Facility: HOSPITAL | Age: 74
Discharge: HOME | End: 2019-12-18
Admitting: INTERNAL MEDICINE

## 2019-12-18 DIAGNOSIS — Z12.31 VISIT FOR SCREENING MAMMOGRAM: ICD-10-CM

## 2019-12-18 PROCEDURE — 77067 SCR MAMMO BI INCL CAD: CPT | Performed by: RADIOLOGY

## 2019-12-18 PROCEDURE — 77067 SCR MAMMO BI INCL CAD: CPT

## 2019-12-18 PROCEDURE — 77063 BREAST TOMOSYNTHESIS BI: CPT | Performed by: RADIOLOGY

## 2019-12-18 PROCEDURE — 77063 BREAST TOMOSYNTHESIS BI: CPT

## 2020-12-21 ENCOUNTER — HOSPITAL ENCOUNTER (OUTPATIENT)
Dept: MAMMOGRAPHY | Facility: HOSPITAL | Age: 75
Discharge: HOME | End: 2020-12-21

## 2020-12-21 ENCOUNTER — HOSPITAL ENCOUNTER (OUTPATIENT)
Dept: BONE DENSITY | Facility: HOSPITAL | Age: 75
Discharge: HOME | End: 2020-12-21

## 2020-12-21 DIAGNOSIS — Z12.31 VISIT FOR SCREENING MAMMOGRAM: ICD-10-CM

## 2020-12-21 DIAGNOSIS — M81.0 SENILE OSTEOPOROSIS: ICD-10-CM

## 2020-12-21 PROCEDURE — 77080 DXA BONE DENSITY AXIAL: CPT | Performed by: RADIOLOGY

## 2020-12-21 PROCEDURE — 77067 SCR MAMMO BI INCL CAD: CPT

## 2020-12-21 PROCEDURE — 77063 BREAST TOMOSYNTHESIS BI: CPT

## 2020-12-21 PROCEDURE — 77063 BREAST TOMOSYNTHESIS BI: CPT | Performed by: RADIOLOGY

## 2020-12-21 PROCEDURE — 77080 DXA BONE DENSITY AXIAL: CPT

## 2020-12-21 PROCEDURE — 77067 SCR MAMMO BI INCL CAD: CPT | Performed by: RADIOLOGY

## 2021-02-02 ENCOUNTER — TRANSCRIBE ORDERS (OUTPATIENT)
Dept: INFUSION THERAPY | Facility: HOSPITAL | Age: 76
End: 2021-02-02

## 2021-02-02 DIAGNOSIS — M81.0 SENILE OSTEOPOROSIS: Primary | ICD-10-CM

## 2021-02-07 NOTE — ANESTHESIA POSTPROCEDURE EVALUATION
Patient: Estella Kaur    Procedure Summary     Date:  05/15/18 Room / Location:  [facility] OR [address] [place] [address] OR    Anesthesia Start:  0830 Anesthesia Stop:  0851    Procedures:       ESOPHAGOGASTRODUODENOSCOPY WITH BIOPSY  CPTCODE:56894 (N/A Esophagus)      COLONOSCOPY  CPTCODE:05992 (N/A ) Diagnosis:       PUD (peptic ulcer disease)      Colon cancer screening      Right upper quadrant abdominal pain      (PUD (peptic ulcer disease) [K27.9])      (Colon cancer screening [Z12.11])      (Right upper quadrant abdominal pain [R10.11])    Surgeon:  Wade Ramos III, MD Provider:  Girish Brito MD    Anesthesia Type:  general ASA Status:  2          Anesthesia Type: general  Last vitals  BP   120/83 (05/15/18 0902)   Temp   97.1 °F (36.2 °C) (05/15/18 0852)   Pulse   85 (05/15/18 0902)   Resp   18 (05/15/18 0902)     SpO2   94 % (05/15/18 0902)     Post Anesthesia Care and Evaluation    Patient location during evaluation: PHASE II  Patient participation: complete - patient participated  Level of consciousness: awake and alert  Pain score: 0  Pain management: adequate  Airway patency: patent  Anesthetic complications: No anesthetic complications    Cardiovascular status: acceptable  Respiratory status: acceptable  Hydration status: acceptable      
No

## 2021-02-12 DIAGNOSIS — Z23 IMMUNIZATION DUE: ICD-10-CM

## 2022-01-17 ENCOUNTER — APPOINTMENT (OUTPATIENT)
Dept: MAMMOGRAPHY | Facility: HOSPITAL | Age: 77
End: 2022-01-17

## 2022-02-14 ENCOUNTER — HOSPITAL ENCOUNTER (OUTPATIENT)
Dept: MAMMOGRAPHY | Facility: HOSPITAL | Age: 77
Discharge: HOME | End: 2022-02-14
Admitting: INTERNAL MEDICINE

## 2022-02-14 DIAGNOSIS — Z12.31 VISIT FOR SCREENING MAMMOGRAM: ICD-10-CM

## 2022-02-14 PROCEDURE — 77063 BREAST TOMOSYNTHESIS BI: CPT | Performed by: RADIOLOGY

## 2022-02-14 PROCEDURE — 77063 BREAST TOMOSYNTHESIS BI: CPT

## 2022-02-14 PROCEDURE — 77067 SCR MAMMO BI INCL CAD: CPT

## 2022-02-14 PROCEDURE — 77067 SCR MAMMO BI INCL CAD: CPT | Performed by: RADIOLOGY

## 2022-03-24 ENCOUNTER — OFFICE VISIT (OUTPATIENT)
Dept: NEUROSURGERY | Facility: CLINIC | Age: 77
End: 2022-03-24

## 2022-03-24 VITALS
TEMPERATURE: 97.5 F | BODY MASS INDEX: 25.49 KG/M2 | DIASTOLIC BLOOD PRESSURE: 92 MMHG | WEIGHT: 135 LBS | HEIGHT: 61 IN | SYSTOLIC BLOOD PRESSURE: 152 MMHG

## 2022-03-24 DIAGNOSIS — D49.6 BRAIN TUMOR: Primary | ICD-10-CM

## 2022-03-24 PROCEDURE — 99204 OFFICE O/P NEW MOD 45 MIN: CPT | Performed by: STUDENT IN AN ORGANIZED HEALTH CARE EDUCATION/TRAINING PROGRAM

## 2022-03-24 RX ORDER — AZELASTINE 1 MG/ML
SPRAY, METERED NASAL
COMMUNITY
Start: 2021-11-06 | End: 2022-11-17

## 2022-03-24 RX ORDER — FLUTICASONE FUROATE 27.5 UG/1
SPRAY, METERED NASAL
COMMUNITY
Start: 2022-02-20 | End: 2022-11-17

## 2022-03-24 RX ORDER — VIT C/ZN GLUC/HERBAL NO.325 90 MG-15MG
LOZENGE MUCOUS MEMBRANE
COMMUNITY
Start: 2020-01-01

## 2022-03-24 RX ORDER — LOSARTAN POTASSIUM 50 MG/1
TABLET ORAL
COMMUNITY
Start: 2018-01-01

## 2022-03-24 NOTE — PROGRESS NOTES
Patient: Estella Kaur  : 1945    Primary Care Provider: Bouchra Jane MD    Requesting Provider: As above      Chief Complaint: Brain Tumor      History of Present Illness: This is a 76 y.o. female who was previously followed by Dr. Trinidad for intraventricular tumor.  The patient had serial scans are stable in size, and the patient remained asymptomatic. 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  Since October, the patient has developed pulsatile tinnitus of her right ear.  She was evaluated by ENT who sent her for an MRA and MRV the and she was subsequently referred to me for further evaluation.  In talking the patient, she is describes pulsatile tinnitus in the right ear.  It is worse at night and can be quite loud at times.  She denies any headache, vision changes or weakness in her arms or legs.    PMHX  Allergies:  Allergies   Allergen Reactions   • Codeine Nausea And Vomiting   • Contrast Dye Nausea And Vomiting     Patient states not true allergy. Has had contrast since then and was fine.     Medications    Current Outpatient Medications:   •  azelastine (ASTELIN) 0.1 % nasal spray, , Disp: , Rfl:   •  esomeprazole (nexIUM) 40 MG capsule, Take 1 capsule by mouth Every Morning Before Breakfast., Disp: 30 capsule, Rfl: 11  •  Fexofenadine-Pseudoephedrine (ALLEGRA-D 24 HOUR PO), , Disp: , Rfl:   •  fluticasone (Flonase Sensimist) 27.5 MCG/SPRAY nasal spray, , Disp: , Rfl:   •  losartan (Cozaar) 50 MG tablet, , Disp: , Rfl:   •  lovastatin (MEVACOR) 40 MG tablet, Take 40 mg by mouth Every Night., Disp: , Rfl:   •  Misc Natural Products (Elderberry Zinc/Vit C/Immune) lozenge, , Disp: , Rfl:   Past Medical History:  Past Medical History:   Diagnosis Date   • Abnormal ECG    • Arthritis    • Coronary artery disease In Hospital  PVC's    Started meds for same.   • Deep vein thrombosis (HCC) PE    30 yrs ago   • Disease of thyroid gland    • Elevated cholesterol    •  Fracture of right distal radius    • GERD (gastroesophageal reflux disease)    • Headache     Occasional headache, not migraines   • History of transfusion    • Hypercholesteremia    • Hypertension      Past Surgical History:  Past Surgical History:   Procedure Laterality Date   • APPENDECTOMY     • CEREBRAL ANGIOGRAM  MRA 1/26/22   • CHOLECYSTECTOMY     • COLONOSCOPY     • COLONOSCOPY N/A 05/15/2018    Procedure: COLONOSCOPY  CPTCODE:17302;  Surgeon: Wade Ramos III, MD;  Location: [facility] OR;  Service: Gastroenterology   • ENDOSCOPY     • ENDOSCOPY N/A 05/15/2018    Procedure: ESOPHAGOGASTRODUODENOSCOPY WITH BIOPSY  CPTCODE:15334;  Surgeon: Wade Ramos III, MD;  Location: [facility] OR;  Service: Gastroenterology   • EPIDURAL BLOCK  When I had hip surgery   • HYSTERECTOMY     • JOINT REPLACEMENT        hips    both   • NECK SURGERY  Thyroid   • ORIF WRIST FRACTURE Right 08/24/2016    Procedure: WRIST OPEN REDUCTION INTERNAL FIXATION;  Surgeon: Shadi Womack MD;  Location: [facility] OR;  Service:    • THYROID SURGERY  2002    thyroidectomy w/ metal clips   • TOTAL HIP ARTHROPLASTY      bilateral      Social Hx:  Social History     Tobacco Use   • Smoking status: Never Smoker   • Smokeless tobacco: Never Used   Substance Use Topics   • Alcohol use: No   • Drug use: No     Family Hx:  Family History   Problem Relation Age of Onset   • Hyperlipidemia Mother    • Cancer Mother    • Thyroid disease Mother    • Hyperlipidemia Father    • Alcohol abuse Father    • Alcohol abuse Sister    • Alcohol abuse Brother    • Cancer Sister    • Breast cancer Neg Hx      Review of Systems:        Review of Systems   Constitutional: Negative for activity change, appetite change, chills, diaphoresis, fatigue, fever and unexpected weight change.   HENT: Positive for ear discharge, ear pain, sinus pressure, sneezing, sore throat and tinnitus. Negative for congestion, dental problem, drooling, facial swelling,  "hearing loss, mouth sores, nosebleeds, postnasal drip, rhinorrhea, trouble swallowing and voice change.    Eyes: Positive for itching. Negative for photophobia, pain, discharge, redness and visual disturbance.   Respiratory: Negative for apnea, cough, choking, chest tightness, shortness of breath, wheezing and stridor.    Cardiovascular: Positive for palpitations. Negative for chest pain and leg swelling.   Gastrointestinal: Positive for abdominal pain. Negative for abdominal distention, anal bleeding, blood in stool, constipation, diarrhea, nausea, rectal pain and vomiting.   Endocrine: Negative for cold intolerance, heat intolerance, polydipsia, polyphagia and polyuria.   Genitourinary: Negative for decreased urine volume, difficulty urinating, dysuria, enuresis, flank pain, frequency, genital sores, hematuria and urgency.   Musculoskeletal: Positive for arthralgias and myalgias. Negative for back pain, gait problem, joint swelling, neck pain and neck stiffness.   Skin: Negative for color change, pallor, rash and wound.   Allergic/Immunologic: Negative for environmental allergies, food allergies and immunocompromised state.   Neurological: Positive for headaches. Negative for dizziness, tremors, seizures, syncope, facial asymmetry, speech difficulty, weakness, light-headedness and numbness.   Hematological: Negative for adenopathy. Does not bruise/bleed easily.   Psychiatric/Behavioral: Negative for agitation, behavioral problems, confusion, decreased concentration, dysphoric mood, hallucinations, self-injury, sleep disturbance and suicidal ideas. The patient is not nervous/anxious and is not hyperactive.    All other systems reviewed and are negative.       Physical Exam:   /92 (BP Location: Right arm, Patient Position: Sitting, Cuff Size: Adult)   Temp 97.5 °F (36.4 °C) (Infrared)   Ht 154.9 cm (61\")   Wt 61.2 kg (135 lb)   LMP 08/24/1969 (Within Months)   BMI 25.51 kg/m²   Awake, alert and oriented x " 3  Speech f/c  Opens eyes spont  Pupils 3 mm rx bilaterally  Extraocular muscles intact bilaterally  Normal sensation to light touch in all 3 distributions of CN V bilaterally  Face symmetric bilaterally  Tongue midline  5/5 in all 4 ext  Normal sensation to light touch in all 4 ext  2+DTR's  No cano's or clonus bilaterally  No pronator drift or dysmetria  Gait not assessed    Diagnostic Studies:  All neurological imaging studies were independently reviewed unless stated otherwise    Assessment/Plan:  This is a 76 y.o. 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  The patient was referred to me after undergoing an MRA and MRV for right-sided pulsatile.  The images from these scans are quite limited and it is difficult to interpret them.  The tumor itself looks to be stable in size.  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  I am going to order a new brain MRI with and without contrast for better evaluation.  I will call the patient with the results of the scan.  Pending the results, she may need a formal cerebral angiogram.    Diagnoses and all orders for this visit:    1. Brain tumor (HCC) (Primary)  -     MRI Brain With & Without Contrast; Future        Vlad Frazier MD  03/24/22  14:14 EDT

## 2022-04-14 ENCOUNTER — HOSPITAL ENCOUNTER (OUTPATIENT)
Dept: MRI IMAGING | Facility: HOSPITAL | Age: 77
Discharge: HOME | End: 2022-04-14
Admitting: STUDENT IN AN ORGANIZED HEALTH CARE EDUCATION/TRAINING PROGRAM

## 2022-04-14 DIAGNOSIS — D49.6 BRAIN TUMOR: ICD-10-CM

## 2022-04-14 LAB — CREAT BLDA-MCNC: 0.7 MG/DL (ref 0.6–1.3)

## 2022-04-14 PROCEDURE — 82565 ASSAY OF CREATININE: CPT

## 2022-04-14 PROCEDURE — 0 GADOBENATE DIMEGLUMINE 529 MG/ML SOLUTION: Performed by: STUDENT IN AN ORGANIZED HEALTH CARE EDUCATION/TRAINING PROGRAM

## 2022-04-14 PROCEDURE — 70553 MRI BRAIN STEM W/O & W/DYE: CPT | Performed by: RADIOLOGY

## 2022-04-14 PROCEDURE — 0 GADOBENATE DIMEGLUMINE 529 MG/ML SOLUTION

## 2022-04-14 PROCEDURE — 70553 MRI BRAIN STEM W/O & W/DYE: CPT

## 2022-04-14 PROCEDURE — A9577 INJ MULTIHANCE: HCPCS

## 2022-04-14 PROCEDURE — A9577 INJ MULTIHANCE: HCPCS | Performed by: STUDENT IN AN ORGANIZED HEALTH CARE EDUCATION/TRAINING PROGRAM

## 2022-04-14 RX ADMIN — GADOBENATE DIMEGLUMINE 12 ML: 529 INJECTION, SOLUTION INTRAVENOUS at 09:30

## 2022-04-18 ENCOUNTER — DOCUMENTATION (OUTPATIENT)
Dept: NEUROSURGERY | Facility: CLINIC | Age: 77
End: 2022-04-18

## 2022-04-18 NOTE — PROGRESS NOTES
I called the patient with results of her new brain MRI.  There has been no significant change in the intraventricular tumor seen on her prior imaging.  I explained to her that given the stability of the scan, there is no role for any type of intervention.  The patient is continuing to have pulsatile tinnitus of the right ear.  I did offer her the opportunity to undergo a cerebral angiogram for better evaluation, but the patient would like to continue to monitor at this time.  Given her age, I think it is reasonable to do this.  We will plan to have her return to clinic in 6 months to see how she is doing.  If the pulsatile tinnitus is persisting, it would be reasonable to proceed with cerebral angiogram at that time.

## 2022-04-20 DIAGNOSIS — D49.6 BRAIN TUMOR: Primary | ICD-10-CM

## 2022-10-20 ENCOUNTER — APPOINTMENT (OUTPATIENT)
Dept: MRI IMAGING | Facility: HOSPITAL | Age: 77
End: 2022-10-20

## 2022-11-17 ENCOUNTER — OFFICE VISIT (OUTPATIENT)
Dept: NEUROSURGERY | Facility: CLINIC | Age: 77
End: 2022-11-17

## 2022-11-17 ENCOUNTER — HOSPITAL ENCOUNTER (OUTPATIENT)
Dept: MRI IMAGING | Facility: HOSPITAL | Age: 77
Discharge: HOME | End: 2022-11-17
Admitting: STUDENT IN AN ORGANIZED HEALTH CARE EDUCATION/TRAINING PROGRAM

## 2022-11-17 VITALS
TEMPERATURE: 97.3 F | BODY MASS INDEX: 24.96 KG/M2 | DIASTOLIC BLOOD PRESSURE: 74 MMHG | HEIGHT: 61 IN | SYSTOLIC BLOOD PRESSURE: 142 MMHG | WEIGHT: 132.2 LBS

## 2022-11-17 DIAGNOSIS — D49.6 BRAIN TUMOR: ICD-10-CM

## 2022-11-17 DIAGNOSIS — D49.6 BRAIN TUMOR: Primary | ICD-10-CM

## 2022-11-17 LAB — CREAT BLDA-MCNC: 0.7 MG/DL (ref 0.6–1.3)

## 2022-11-17 PROCEDURE — 70553 MRI BRAIN STEM W/O & W/DYE: CPT

## 2022-11-17 PROCEDURE — A9577 INJ MULTIHANCE: HCPCS | Performed by: STUDENT IN AN ORGANIZED HEALTH CARE EDUCATION/TRAINING PROGRAM

## 2022-11-17 PROCEDURE — 99213 OFFICE O/P EST LOW 20 MIN: CPT | Performed by: STUDENT IN AN ORGANIZED HEALTH CARE EDUCATION/TRAINING PROGRAM

## 2022-11-17 PROCEDURE — 0 GADOBENATE DIMEGLUMINE 529 MG/ML SOLUTION: Performed by: STUDENT IN AN ORGANIZED HEALTH CARE EDUCATION/TRAINING PROGRAM

## 2022-11-17 PROCEDURE — 82565 ASSAY OF CREATININE: CPT

## 2022-11-17 PROCEDURE — 70553 MRI BRAIN STEM W/O & W/DYE: CPT | Performed by: RADIOLOGY

## 2022-11-17 RX ORDER — METOPROLOL SUCCINATE 25 MG/1
25 TABLET, EXTENDED RELEASE ORAL DAILY
COMMUNITY
Start: 2022-08-15

## 2022-11-17 RX ORDER — OMEGA-3 FATTY ACIDS/FISH OIL 300-1000MG
CAPSULE ORAL
COMMUNITY

## 2022-11-17 RX ORDER — DIAPER,BRIEF,ADULT, DISPOSABLE
EACH MISCELLANEOUS
COMMUNITY

## 2022-11-17 RX ADMIN — GADOBENATE DIMEGLUMINE 12 ML: 529 INJECTION, SOLUTION INTRAVENOUS at 10:40

## 2022-11-17 NOTE — PROGRESS NOTES
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  She comes in today for follow-up.  Overall, she is doing well.  She denies any issues with headaches, vision changes or weakness in her arms or legs.  She notes improvement in her previous pulsatile tinnitus.    Objective    Vital Signs: Blood pressure 142/74, temperature 97.3 °F (36.3 °C), temperature source Infrared, height 154.9 cm (61\"), weight 60 kg (132 lb 3.2 oz), last menstrual period 08/24/1969, not currently breastfeeding.    Physical Exam  Awake, alert and oriented x 3  Opens eyes spont  Pupils 3 mm rx bilat  Extraocular muscles intact bilaterally  Face symmetric bilaterally  Tongue midline  5/5 in all 4 ext  No pronator drift    Current Medications:   Current Outpatient Medications:   •  amLODIPine Besylate-Celecoxib 2.5-200 MG tablet, , Disp: , Rfl:   •  Coenzyme Q10 ([employer] CoQ-10) 200 MG capsule, , Disp: , Rfl:   •  esomeprazole (nexIUM) 40 MG capsule, Take 1 capsule by mouth Every Morning Before Breakfast., Disp: 30 capsule, Rfl: 11  •  Ibuprofen 200 MG capsule, , Disp: , Rfl:   •  losartan (COZAAR) 50 MG tablet, , Disp: , Rfl:   •  lovastatin (MEVACOR) 40 MG tablet, Take 40 mg by mouth Every Night., Disp: , Rfl:   •  metoprolol succinate XL (TOPROL-XL) 25 MG 24 hr tablet, Take 25 mg by mouth Daily., Disp: , Rfl:   •  Misc Natural Products (Elderberry Zinc/Vit C/Immune) lozenge, , Disp: , Rfl:   No current facility-administered medications for this visit.     Laboratory Results:                              Brief Urine Lab Results     None        Microbiology Results (last 10 days)     ** No results found for the last 240 hours. **          Diagnostic Imaging: I reviewed and independently interpreted the new imaging.     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  Clinically, the patient is doing well.  She has no new issues of headaches, vision changes or weakness in her arms or legs.  Additionally, she has noted improvement in her pulsatile tinnitus.  Her new MRI from today shows stable size of lesion without any increased size of her ventricles.  We will continue to monitor this and plan to have him back in 1 year with a new MRI.    Diagnoses and all orders for this visit:    1. Brain tumor (HCC) (Primary)  -     MRI Brain With & Without Contrast; Future        Vlad Frazier MD  11/17/22  12:54 EST

## 2022-12-15 ENCOUNTER — TRANSCRIBE ORDERS (OUTPATIENT)
Dept: ADMINISTRATIVE | Facility: HOSPITAL | Age: 77
End: 2022-12-15

## 2022-12-15 DIAGNOSIS — M81.0 SENILE OSTEOPOROSIS: ICD-10-CM

## 2022-12-15 DIAGNOSIS — Z12.31 VISIT FOR SCREENING MAMMOGRAM: Primary | ICD-10-CM

## 2023-03-09 ENCOUNTER — HOSPITAL ENCOUNTER (OUTPATIENT)
Dept: BONE DENSITY | Facility: HOSPITAL | Age: 78
Discharge: HOME | End: 2023-03-09
Payer: MEDICARE

## 2023-03-09 ENCOUNTER — HOSPITAL ENCOUNTER (OUTPATIENT)
Dept: MAMMOGRAPHY | Facility: HOSPITAL | Age: 78
Discharge: HOME | End: 2023-03-09
Payer: MEDICARE

## 2023-03-09 DIAGNOSIS — Z12.31 VISIT FOR SCREENING MAMMOGRAM: ICD-10-CM

## 2023-03-09 DIAGNOSIS — M81.0 SENILE OSTEOPOROSIS: ICD-10-CM

## 2023-03-09 PROCEDURE — 77067 SCR MAMMO BI INCL CAD: CPT | Performed by: RADIOLOGY

## 2023-03-09 PROCEDURE — 77080 DXA BONE DENSITY AXIAL: CPT | Performed by: RADIOLOGY

## 2023-03-09 PROCEDURE — 77063 BREAST TOMOSYNTHESIS BI: CPT

## 2023-03-09 PROCEDURE — 77080 DXA BONE DENSITY AXIAL: CPT

## 2023-03-09 PROCEDURE — 77063 BREAST TOMOSYNTHESIS BI: CPT | Performed by: RADIOLOGY

## 2023-03-09 PROCEDURE — 77067 SCR MAMMO BI INCL CAD: CPT

## 2023-11-08 ENCOUNTER — TELEPHONE (OUTPATIENT)
Dept: NEUROSURGERY | Facility: CLINIC | Age: 78
End: 2023-11-08
Payer: MEDICARE

## 2023-11-08 NOTE — TELEPHONE ENCOUNTER
Dr. Frazier is returning to [place] on 11/16/23. Spoke to patient. Appointment scheduled. Paperwork mailed to update chart.

## 2023-11-08 NOTE — TELEPHONE ENCOUNTER
PATIENT CALLED IN AND WANTS TO BE SCHEDULED IN [place] - HAS 1 YEAR FOLLOW UP COMING UP AND HER MRI SCHEDULED FOR 11/11/23 - I HAVE NOTIFIED THAT DR. ROJAS IS NOT TRAVELING TO [place] AND PATIENT STATES SHE CANNOT TRAVEL TO [place].      PLEASE CALL PATIENT WITH ANY ADVICE. SHE IS OPEN TO SEEING A PA     PATIENT ALSO STATES SHE IS NOT HAVING ANY SYMPTOMS AND SHE IS JUST DOING THIS FOR FOLLOW UP. IF DR. ROJAS WANTS TO SEE HER AFTER THE 1ST OF 2024 SHE CAN WAIT FOR HIM.        THANK YOU!

## 2023-11-11 ENCOUNTER — HOSPITAL ENCOUNTER (OUTPATIENT)
Dept: MRI IMAGING | Facility: HOSPITAL | Age: 78
Discharge: HOME | End: 2023-11-11
Admitting: STUDENT IN AN ORGANIZED HEALTH CARE EDUCATION/TRAINING PROGRAM
Payer: MEDICARE

## 2023-11-11 DIAGNOSIS — D49.6 BRAIN TUMOR: ICD-10-CM

## 2023-11-11 PROCEDURE — A9577 INJ MULTIHANCE: HCPCS | Performed by: STUDENT IN AN ORGANIZED HEALTH CARE EDUCATION/TRAINING PROGRAM

## 2023-11-11 PROCEDURE — 70553 MRI BRAIN STEM W/O & W/DYE: CPT

## 2023-11-11 PROCEDURE — 0 GADOBENATE DIMEGLUMINE 529 MG/ML SOLUTION: Performed by: STUDENT IN AN ORGANIZED HEALTH CARE EDUCATION/TRAINING PROGRAM

## 2023-11-11 RX ADMIN — GADOBENATE DIMEGLUMINE 12 ML: 529 INJECTION, SOLUTION INTRAVENOUS at 09:52

## 2023-11-16 ENCOUNTER — OFFICE VISIT (OUTPATIENT)
Dept: NEUROSURGERY | Facility: CLINIC | Age: 78
End: 2023-11-16
Payer: MEDICARE

## 2023-11-16 VITALS
BODY MASS INDEX: 26.06 KG/M2 | HEIGHT: 61 IN | WEIGHT: 138 LBS | DIASTOLIC BLOOD PRESSURE: 80 MMHG | SYSTOLIC BLOOD PRESSURE: 138 MMHG

## 2023-11-16 DIAGNOSIS — D49.6 BRAIN TUMOR: Primary | ICD-10-CM

## 2023-11-16 NOTE — PROGRESS NOTES
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  She is here today for follow-up.  Clinically, the patient's been doing well.  She has no new issues with headaches, vision changes or weakness in her arms and legs.  She states she is feeling well.    Objective    Vital Signs: Blood pressure 138/80, height 154.9 cm (61\"), weight 62.6 kg (138 lb), last menstrual period 1969, not currently breastfeeding.    Physical Exam  Awake, alert and oriented x 3  Opens eyes spont  Pupils 3 mm rx bilat  Extraocular muscles intact bilaterally  Face symmetric bilaterally  Tongue midline  5/5 in all 4 ext  No pronator drift    Current Medications:   Current Outpatient Medications:     amLODIPine Besylate-Celecoxib 2.5-200 MG tablet, , Disp: , Rfl:     Coenzyme Q10 ([employer]S CoQ-10) 200 MG capsule, , Disp: , Rfl:     esomeprazole (nexIUM) 40 MG capsule, Take 1 capsule by mouth Every Morning Before Breakfast., Disp: 30 capsule, Rfl: 11    Ibuprofen 200 MG capsule, , Disp: , Rfl:     losartan (COZAAR) 50 MG tablet, , Disp: , Rfl:     lovastatin (MEVACOR) 40 MG tablet, Take 1 tablet by mouth Every Night., Disp: , Rfl:     metoprolol succinate XL (TOPROL-XL) 25 MG 24 hr tablet, Take 1 tablet by mouth Daily., Disp: , Rfl:     [facility] Products (Elderberry Zinc/Vit C/Immune) lozenge, , Disp: , Rfl:      Laboratory Results:                              Brief Urine Lab Results       None          Microbiology Results (last 10 days)       ** No results found for the last 240 hours. **            Diagnostic Imaging: I reviewed and independently interpreted the new imaging.     Assessment/Plan:  This is a 78-year-old female whom we have been following for several years with an intraventricular tumor, likely a " neurocytoma.  Clinically, the patient is doing well.  She has no new issues or focal neurological deficits.  Her new MRI scan shows no change in the size of the lesion.  There is no significant hydrocephalus.  As such, there is no role for intervention.  We are going to stretch the patient's follow-up to 2 years.  We will plan to have her back with a new MRI scan at that time.    Diagnoses and all orders for this visit:    1. Brain tumor (Primary)  -     MRI Brain With & Without Contrast; Future        Vlad Frazier MD  11/16/23  12:47 EST

## 2024-02-05 ENCOUNTER — TRANSCRIBE ORDERS (OUTPATIENT)
Dept: ADMINISTRATIVE | Facility: HOSPITAL | Age: 79
End: 2024-02-05
Payer: MEDICARE

## 2024-02-05 DIAGNOSIS — Z12.31 BREAST CANCER SCREENING BY MAMMOGRAM: Primary | ICD-10-CM

## 2024-03-11 ENCOUNTER — HOSPITAL ENCOUNTER (OUTPATIENT)
Facility: HOSPITAL | Age: 79
Discharge: HOME | End: 2024-03-11
Admitting: PHYSICIAN ASSISTANT
Payer: MEDICARE

## 2024-03-11 DIAGNOSIS — Z12.31 BREAST CANCER SCREENING BY MAMMOGRAM: ICD-10-CM

## 2024-03-11 PROCEDURE — 77063 BREAST TOMOSYNTHESIS BI: CPT | Performed by: RADIOLOGY

## 2024-03-11 PROCEDURE — 77067 SCR MAMMO BI INCL CAD: CPT

## 2024-03-11 PROCEDURE — 77063 BREAST TOMOSYNTHESIS BI: CPT

## 2024-03-11 PROCEDURE — 77067 SCR MAMMO BI INCL CAD: CPT | Performed by: RADIOLOGY

## 2024-04-11 DIAGNOSIS — M25.511 RIGHT SHOULDER PAIN, UNSPECIFIED CHRONICITY: Primary | ICD-10-CM

## 2024-04-17 ENCOUNTER — OFFICE VISIT (OUTPATIENT)
Dept: ORTHOPEDIC SURGERY | Facility: CLINIC | Age: 79
End: 2024-04-17
Payer: MEDICARE

## 2024-04-17 ENCOUNTER — HOSPITAL ENCOUNTER (OUTPATIENT)
Dept: GENERAL RADIOLOGY | Facility: HOSPITAL | Age: 79
Discharge: HOME | End: 2024-04-17
Admitting: ORTHOPAEDIC SURGERY
Payer: MEDICARE

## 2024-04-17 VITALS
HEIGHT: 61 IN | DIASTOLIC BLOOD PRESSURE: 98 MMHG | HEART RATE: 87 BPM | WEIGHT: 138 LBS | BODY MASS INDEX: 26.06 KG/M2 | SYSTOLIC BLOOD PRESSURE: 177 MMHG

## 2024-04-17 DIAGNOSIS — R22.2 MASS OF CHEST WALL, RIGHT: Primary | ICD-10-CM

## 2024-04-17 DIAGNOSIS — R07.89 NON-CARDIAC CHEST PAIN: Primary | ICD-10-CM

## 2024-04-17 DIAGNOSIS — M25.511 RIGHT SHOULDER PAIN, UNSPECIFIED CHRONICITY: ICD-10-CM

## 2024-04-17 DIAGNOSIS — R07.89 NON-CARDIAC CHEST PAIN: ICD-10-CM

## 2024-04-17 PROCEDURE — 71046 X-RAY EXAM CHEST 2 VIEWS: CPT

## 2024-04-17 PROCEDURE — 73030 X-RAY EXAM OF SHOULDER: CPT

## 2024-04-17 PROCEDURE — 99203 OFFICE O/P NEW LOW 30 MIN: CPT | Performed by: ORTHOPAEDIC SURGERY

## 2024-04-17 NOTE — PROGRESS NOTES
New Patient Visit      Patient: Estella Kaur  YOB: 1945  Date of Encounter: 04/17/2024        Chief Complaint:   Chief Complaint   Patient presents with    Right Shoulder - Initial Evaluation, Pain, Edema           HPI:   Estella Kaur, 78 y.o. female, referred by Lisa Rosario PA presents evaluation of painful mass posterior aspect of her chest.  She describes the above symptoms for years mass has been present for years but recently seems to be more painful.  She denies trauma.  No difficulty breathing.  Her past medical history includes peptic ulcer disease DVT with pulmonary embolism 30 years ago thyroid disease bilateral hip replacements.        Active Problem List:  Patient Active Problem List   Diagnosis    Fall    HLD (hyperlipidemia)    Fracture of right distal radius    Closed fracture of radius    S/P wrist surgery    PUD (peptic ulcer disease)    Colon cancer screening    Right upper quadrant abdominal pain    Chest pain           Past Medical History:  Past Medical History:   Diagnosis Date    Abnormal ECG     Arthritis     Coronary artery disease In Hospital 7/8 PVC's    Started meds for same.    Deep vein thrombosis PE    30 yrs ago    Disease of thyroid gland     Elevated cholesterol     Fracture of right distal radius     Fracture of wrist     Fracture, radius     Fracture, ulna     GERD (gastroesophageal reflux disease)     Headache     Occasional headache, not migraines    Hip arthrosis     History of transfusion     Hypercholesteremia     Hypertension            Past Surgical History:  Past Surgical History:   Procedure Laterality Date    APPENDECTOMY      CEREBRAL ANGIOGRAM  MRA 1/26/22    CHOLECYSTECTOMY      COLONOSCOPY      COLONOSCOPY N/A 05/15/2018    Procedure: COLONOSCOPY  CPTCODE:69441;  Surgeon: Wade Ramos III, MD;  Location: [facility];  Service: Gastroenterology    ENDOSCOPY      ENDOSCOPY N/A 05/15/2018    Procedure: ESOPHAGOGASTRODUODENOSCOPY  WITH BIOPSY  CPTCODE:49181;  Surgeon: Wade Ramos III, MD;  Location: [facility] OR;  Service: Gastroenterology    EPIDURAL BLOCK  When I had hip surgery    HAND SURGERY  08/24/2016    HYSTERECTOMY      JOINT REPLACEMENT        hips    both    NECK SURGERY  Thyroid    ORIF WRIST FRACTURE Right 08/24/2016    Procedure: WRIST OPEN REDUCTION INTERNAL FIXATION;  Surgeon: Shadi Womack MD;  Location: [facility] OR;  Service:     THYROID SURGERY  2002    thyroidectomy w/ metal clips    TOTAL HIP ARTHROPLASTY      bilateral            Family History:  Family History   Problem Relation Age of Onset    Hyperlipidemia Mother     Cancer Mother     Thyroid disease Mother     Hyperlipidemia Father     Alcohol abuse Father     Alcohol abuse Sister     Alcohol abuse Brother     Cancer Sister     Breast cancer Neg Hx          Social History:  Social History     Socioeconomic History    Marital status:    Tobacco Use    Smoking status: Never    Smokeless tobacco: Never   Vaping Use    Vaping status: Never Used   Substance and Sexual Activity    Alcohol use: No    Drug use: No    Sexual activity: Yes     Partners: Male     Birth control/protection: Post-menopausal, Surgical     Comment: Post hysterectomy     Body mass index is 26.07 kg/m².      Medications:  Current Outpatient Medications   Medication Sig Dispense Refill    amLODIPine Besylate-Celecoxib 2.5-200 MG tablet       Coenzyme Q10 ([employer]S CoQ-10) 200 MG capsule       esomeprazole (nexIUM) 40 MG capsule Take 1 capsule by mouth Every Morning Before Breakfast. 30 capsule 11    Ibuprofen 200 MG capsule       losartan (COZAAR) 50 MG tablet       lovastatin (MEVACOR) 40 MG tablet Take 1 tablet by mouth Every Night.      metoprolol succinate XL (TOPROL-XL) 25 MG 24 hr tablet Take 1 tablet by mouth Daily.      Misc Natural Products (Elderberry Zinc/Vit C/Immune) lozenge        No current facility-administered medications for this visit.         Allergies:  Allergies  "  Allergen Reactions    Codeine Nausea And Vomiting    Levofloxacin Nausea And Vomiting         Review of Systems:   Review of Systems   Constitutional: Negative.  Negative for chills, fatigue and fever.   HENT:  Positive for sinus pain. Negative for congestion, ear pain, facial swelling, sore throat, trouble swallowing and voice change.    Eyes:  Negative for pain, discharge, redness and visual disturbance.   Respiratory: Negative.  Negative for apnea, cough, choking, chest tightness, shortness of breath, wheezing and stridor.    Cardiovascular: Negative.  Negative for chest pain, palpitations and leg swelling.   Gastrointestinal: Negative.  Negative for abdominal distention, abdominal pain, blood in stool, nausea and vomiting.   Endocrine: Negative.  Negative for cold intolerance, heat intolerance, polydipsia and polyphagia.   Genitourinary: Negative.  Negative for difficulty urinating, dysuria, flank pain, frequency and hematuria.   Musculoskeletal:  Positive for arthralgias and back pain.   Skin: Negative.  Negative for color change, pallor, rash and wound.   Allergic/Immunologic: Negative.  Negative for environmental allergies, food allergies and immunocompromised state.   Neurological: Negative.  Negative for dizziness, tremors, seizures, syncope, speech difficulty, weakness, light-headedness, numbness and headaches.   Hematological: Negative.  Negative for adenopathy. Does not bruise/bleed easily.   Psychiatric/Behavioral: Negative.  Negative for behavioral problems, confusion, dysphoric mood, self-injury, sleep disturbance and suicidal ideas. The patient is not nervous/anxious.          Physical Exam:   Physical Exam  GENERAL: 78 y.o. female, alert and oriented X 3 in no acute distress.   Visit Vitals  /98   Pulse 87   Ht 154.9 cm (61\")   Wt 62.6 kg (138 lb)   LMP 08/24/1969 (Within Months)   BMI 26.07 kg/m²       GENERAL APPEARANCE: Awake, alert & oriented, in no acute distress and well developed, " well nourished.   PSYCH: Normal mood and affect  LUNGS: Breathing nonlabored, no wheezing  EYES: Sclera anicteric, pupils equal  CARDIOVASCULAR: Palpable pulses. Capillary refill less than 2 seconds  INTEGUMENTARY: Skin intact, co clubbing, cyanosis  NEUROLOGIC: Normal Sensation  MUSCULOSKELETAL:  Orthopedic Examination: Right shoulder unremarkable other than mild limitation of motion.  Prominence posteriorly with protrusion of the scapula right slightly greater than left and soft tissue swelling fairly medial aspect of scapula.  She is moderately tender in this region.          Radiology/Labs:     XR Chest 2 View    Result Date: 4/18/2024    Degenerative changes thoracic spine as described.   This report was finalized on 4/18/2024 8:33 AM by Dr. Calvin Worrell MD.      XR Shoulder 2+ View Right    Result Date: 4/18/2024  1.  Degenerative change of the greater tuberosity. 2.  Sublabral bony cystic change of the glenoid. Degenerative change of the glenohumeral joint.   This report was finalized on 4/18/2024 8:12 AM by Dr. Calvin Worrell MD.           Radiographs chest obtained are unremarkable.          Assessment & Plan:   78 y.o. female presents with posterior chest wall scapular pain with soft tissue mass.  We will request CT scan of the chest and see her back once completed.        ICD-10-CM ICD-9-CM   1. Mass of chest wall, right posterior  R22.2 786.6           Cc:   Lisa Rosario PA                This document has been electronically signed by Higinio Atwood MD   April 19, 2024 13:24 EDT

## 2024-04-25 ENCOUNTER — HOSPITAL ENCOUNTER (OUTPATIENT)
Dept: CT IMAGING | Facility: HOSPITAL | Age: 79
Discharge: HOME | End: 2024-04-25
Admitting: ORTHOPAEDIC SURGERY
Payer: MEDICARE

## 2024-04-25 DIAGNOSIS — R22.2 MASS OF CHEST WALL, RIGHT: ICD-10-CM

## 2024-04-25 PROCEDURE — 71250 CT THORAX DX C-: CPT | Performed by: RADIOLOGY

## 2024-04-25 PROCEDURE — 71250 CT THORAX DX C-: CPT

## 2024-05-01 ENCOUNTER — OFFICE VISIT (OUTPATIENT)
Dept: ORTHOPEDIC SURGERY | Facility: CLINIC | Age: 79
End: 2024-05-01
Payer: MEDICARE

## 2024-05-01 VITALS — WEIGHT: 138 LBS | BODY MASS INDEX: 21.66 KG/M2 | HEIGHT: 67 IN

## 2024-05-01 DIAGNOSIS — M95.8 WINGING OF SCAPULA: Primary | ICD-10-CM

## 2024-05-01 PROCEDURE — 99213 OFFICE O/P EST LOW 20 MIN: CPT | Performed by: ORTHOPAEDIC SURGERY

## 2024-05-01 NOTE — PROGRESS NOTES
Follow-up Visit         Patient: Estella Kaur  YOB: 1945  Date of Encounter: 05/01/2024      Chief  Complaint:   Chief Complaint   Patient presents with    Right Shoulder - Follow-up, Pain         HPI:  Estella Kaur, 78 y.o. female presents in follow-up posterior right shoulder pain of nearly 8 years duration.  She reports no specific trauma she localizes pain to the posterior aspect of her right shoulder.  Last seen with the above findings including prominence and suspected mass of the inferior portion of her right scapula she was referred for CT scan presents today for review reports no change in symptoms.  She describes pain worse with activity such as cleaning her home.        Medical History:  Patient Active Problem List   Diagnosis    Fall    HLD (hyperlipidemia)    Fracture of right distal radius    Closed fracture of radius    S/P wrist surgery    PUD (peptic ulcer disease)    Colon cancer screening    Right upper quadrant abdominal pain    Chest pain     Past Medical History:   Diagnosis Date    Abnormal ECG     Arthritis     Coronary artery disease In Hospital 7/8 PVC's    Started meds for same.    Deep vein thrombosis PE    30 yrs ago    Disease of thyroid gland     Elevated cholesterol     Fracture of right distal radius     Fracture of wrist     Fracture, radius     Fracture, ulna     GERD (gastroesophageal reflux disease)     Headache     Occasional headache, not migraines    Hip arthrosis     History of transfusion     Hypercholesteremia     Hypertension            Social History:  Social History     Socioeconomic History    Marital status:    Tobacco Use    Smoking status: Never    Smokeless tobacco: Never   Vaping Use    Vaping status: Never Used   Substance and Sexual Activity    Alcohol use: No    Drug use: No    Sexual activity: Yes     Partners: Male     Birth control/protection: Post-menopausal, Surgical     Comment: Post hysterectomy           Current  Medications:    Current Outpatient Medications:     amLODIPine Besylate-Celecoxib 2.5-200 MG tablet, , Disp: , Rfl:     Coenzyme Q10 ([employer] CoQ-10) 200 MG capsule, , Disp: , Rfl:     esomeprazole (nexIUM) 40 MG capsule, Take 1 capsule by mouth Every Morning Before Breakfast., Disp: 30 capsule, Rfl: 11    Ibuprofen 200 MG capsule, , Disp: , Rfl:     losartan (COZAAR) 50 MG tablet, , Disp: , Rfl:     lovastatin (MEVACOR) 40 MG tablet, Take 1 tablet by mouth Every Night., Disp: , Rfl:     metoprolol succinate XL (TOPROL-XL) 25 MG 24 hr tablet, Take 1 tablet by mouth Daily., Disp: , Rfl:     Misc Natural Products (Elderberry Zinc/Vit C/Immune) lozenge, , Disp: , Rfl:         Allergies:  Allergies   Allergen Reactions    Codeine Nausea And Vomiting    Levofloxacin Nausea And Vomiting           Family History:  Family History   Problem Relation Age of Onset    Hyperlipidemia Mother     Cancer Mother     Thyroid disease Mother     Hyperlipidemia Father     Alcohol abuse Father     Alcohol abuse Sister     Alcohol abuse Brother     Cancer Sister     Breast cancer Neg Hx            Surgical History:  Past Surgical History:   Procedure Laterality Date    APPENDECTOMY      CEREBRAL ANGIOGRAM  MRA 1/26/22    CHOLECYSTECTOMY      COLONOSCOPY      COLONOSCOPY N/A 05/15/2018    Procedure: COLONOSCOPY  CPTCODE:92117;  Surgeon: Wade Ramos III, MD;  Location: [facility];  Service: Gastroenterology    ENDOSCOPY      ENDOSCOPY N/A 05/15/2018    Procedure: ESOPHAGOGASTRODUODENOSCOPY WITH BIOPSY  CPTCODE:86173;  Surgeon: Wade Ramos III, MD;  Location: [facility] OR;  Service: Gastroenterology    EPIDURAL BLOCK  When I had hip surgery    HAND SURGERY  08/24/2016    HYSTERECTOMY      JOINT REPLACEMENT        hips    both    NECK SURGERY  Thyroid    ORIF WRIST FRACTURE Right 08/24/2016    Procedure: WRIST OPEN REDUCTION INTERNAL FIXATION;  Surgeon: Shadi Womack MD;  Location: [facility] OR;  Service:     THYROID SURGERY   2002    thyroidectomy w/ metal clips    TOTAL HIP ARTHROPLASTY      bilateral            Radiology:   CT Chest Without Contrast Diagnostic    Result Date: 4/25/2024  1. No acute thoracic findings.  2. Other findings as above.      This report was finalized on 4/25/2024 12:42 PM by Dr. Hakeem Swain MD.      XR Chest 2 View    Result Date: 4/18/2024    Degenerative changes thoracic spine as described.   This report was finalized on 4/18/2024 8:33 AM by Dr. Calvin Worrell MD.      XR Shoulder 2+ View Right    Result Date: 4/18/2024  1.  Degenerative change of the greater tuberosity. 2.  Sublabral bony cystic change of the glenoid. Degenerative change of the glenohumeral joint.   This report was finalized on 4/18/2024 8:12 AM by Dr. Calvin Worrell MD.           Radiographs  Scan denied by report right shoulder and scapula are negative.  My review confirms the above no lesions are present within the scapular body or surrounding soft tissue.        Orthopedic Examination: Shoulder again reveals prominence of the scapula.  This improves with manipulation and movement of her shoulder.  Shoulder exam proper is unremarkable.          Assessment & Plan:   78 y.o. female presents follow-up suspected mass posterior aspect right shoulder which is not evident on CT scan.  She presents with clinical findings of scapular winging.  We discussed options and she has elected to proceed with physical therapy.  She will follow-up in 3 months.           Diagnosis Plan   1. Winging of scapula  Ambulatory Referral to Physical Therapy                Cc:  Lisa Rosario PA              This document has been electronically signed by Higinio Atwood MD   May 2, 2024 14:02 EDT

## 2024-05-09 ENCOUNTER — TREATMENT (OUTPATIENT)
Dept: PHYSICAL THERAPY | Facility: CLINIC | Age: 79
End: 2024-05-09
Payer: MEDICARE

## 2024-05-09 DIAGNOSIS — M89.8X1 PAIN OF RIGHT SCAPULA: ICD-10-CM

## 2024-05-09 DIAGNOSIS — M95.8 WINGING OF SCAPULA: Primary | ICD-10-CM

## 2024-05-09 DIAGNOSIS — M25.611 DECREASED RIGHT SHOULDER RANGE OF MOTION: ICD-10-CM

## 2024-05-09 PROCEDURE — 97162 PT EVAL MOD COMPLEX 30 MIN: CPT | Performed by: PHYSICAL THERAPIST

## 2024-05-09 PROCEDURE — 97110 THERAPEUTIC EXERCISES: CPT | Performed by: PHYSICAL THERAPIST

## 2024-05-14 ENCOUNTER — TREATMENT (OUTPATIENT)
Dept: PHYSICAL THERAPY | Facility: CLINIC | Age: 79
End: 2024-05-14
Payer: MEDICARE

## 2024-05-14 DIAGNOSIS — M89.8X1 PAIN OF RIGHT SCAPULA: ICD-10-CM

## 2024-05-14 DIAGNOSIS — M95.8 WINGING OF SCAPULA: Primary | ICD-10-CM

## 2024-05-14 DIAGNOSIS — M25.611 DECREASED RIGHT SHOULDER RANGE OF MOTION: ICD-10-CM

## 2024-05-14 PROCEDURE — 97110 THERAPEUTIC EXERCISES: CPT | Performed by: PHYSICAL THERAPIST

## 2024-05-14 PROCEDURE — 97140 MANUAL THERAPY 1/> REGIONS: CPT | Performed by: PHYSICAL THERAPIST

## 2024-05-14 NOTE — PROGRESS NOTES
Physical Therapy Daily Treatment Note      Patient: Estella Kaur   : 1945  Referring practitioner: Higinio Atwood, *  Date of Initial Visit: Type: THERAPY  Noted: 2024  Today's Date: 2024  Patient seen for 2 sessions       Visit Diagnoses:    ICD-10-CM ICD-9-CM   1. Winging of scapula  M95.8 736.89   2. Pain of right scapula  M89.8X1 733.90   3. Decreased right shoulder range of motion  M25.611 719.51       Subjective Evaluation    History of Present Illness    Subjective comment: Patient reports that she has been performing her HEP.  She denies pain, but notes that she has discomfort at her shoulder blade.Pain  No pain reported           Objective   See Exercise, Manual, and Modality Logs for complete treatment.       Assessment & Plan       Assessment  Assessment details: Patient tolerated today's session well, with no pain pre- or post-tx.  Treatment session initiated with manual therapy, with tenderness and trigger points noted at interscapular region.  There ex performed per flow sheet, with exercises focusing on scapular stabilization.  Cues required to encourage proper form with exercises.  Patient declined ice at conclusion of session.  She will continue to be progressed per her tolerance and POC.          Timed:         Manual Therapy:    15     mins  47312;     Therapeutic Exercise:    15     mins  55915;     Neuromuscular Christie:        mins  98352;    Therapeutic Activity:          mins  71009;     Gait Training:           mins  45243;     Ultrasound:          mins  44421;    Ionto                                   mins   10835  Self Care                            mins   28896      Un-Timed:  Electrical Stimulation:         mins  70769 ( );  Dry Needling          mins self-pay  Traction          mins 19391  Canalith Repos         mins 96691    Timed Treatment:   30  mins   Total Treatment:     30   mins    Karli Frankel, PT  [place] License: [id number]  Electronically signed  by Karli Frankel, PT, 05/14/24, 4:36 PM EDT.

## 2024-05-21 ENCOUNTER — TREATMENT (OUTPATIENT)
Dept: PHYSICAL THERAPY | Facility: CLINIC | Age: 79
End: 2024-05-21
Payer: MEDICARE

## 2024-05-21 DIAGNOSIS — M95.8 WINGING OF SCAPULA: Primary | ICD-10-CM

## 2024-05-21 DIAGNOSIS — M89.8X1 PAIN OF RIGHT SCAPULA: ICD-10-CM

## 2024-05-21 DIAGNOSIS — M25.611 DECREASED RIGHT SHOULDER RANGE OF MOTION: ICD-10-CM

## 2024-05-21 PROCEDURE — 97110 THERAPEUTIC EXERCISES: CPT | Performed by: PHYSICAL THERAPIST

## 2024-05-21 PROCEDURE — 97140 MANUAL THERAPY 1/> REGIONS: CPT | Performed by: PHYSICAL THERAPIST

## 2024-05-21 NOTE — PROGRESS NOTES
Physical Therapy Daily Treatment Note      Patient: Estella Kaur   : 1945  Referring practitioner: Higinio Atwood, *  Date of Initial Visit: Type: THERAPY  Noted: 2024  Today's Date: 2024  Patient seen for 3 sessions       Visit Diagnoses:    ICD-10-CM ICD-9-CM   1. Winging of scapula  M95.8 736.89   2. Pain of right scapula  M89.8X1 733.90   3. Decreased right shoulder range of motion  M25.611 719.51       Subjective Evaluation    History of Present Illness    Subjective comment: Patient reports that she has 1/10 pain today.Pain  Current pain ratin         Objective   See Exercise, Manual, and Modality Logs for complete treatment.       Assessment & Plan       Assessment  Assessment details: Patient tolerated today's session well, noting 0/10 pain post-tx.  Treatment initiated with there ex, with exercises progressed to include increased repetitions and the addition of new exercises.  Exercises focused on stretching and scapular stabilization.  Patient reported tenderness at scapular spine and medial scapular border during STM.  Patient will continue to be progressed per her tolerance and POC.      Timed:         Manual Therapy:    13     mins  35982;     Therapeutic Exercise:    26     mins  20471;     Neuromuscular Christie:        mins  17128;    Therapeutic Activity:          mins  76437;     Gait Training:           mins  46821;     Ultrasound:          mins  62246;    Ionto                                   mins   99243  Self Care                            mins   99253      Un-Timed:  Electrical Stimulation:         mins  60541 ( );  Dry Needling          mins self-pay  Traction          mins 30782  Canalith Repos         mins 49224    Timed Treatment:   39   mins   Total Treatment:     39   mins    Karli Frankel PT  [place] License: [id number]  Electronically signed by Karli Frankel PT, 24, 11:32 AM EDT.

## 2024-05-23 ENCOUNTER — TELEPHONE (OUTPATIENT)
Dept: PHYSICAL THERAPY | Facility: CLINIC | Age: 79
End: 2024-05-23

## 2024-06-03 ENCOUNTER — TREATMENT (OUTPATIENT)
Dept: PHYSICAL THERAPY | Facility: CLINIC | Age: 79
End: 2024-06-03
Payer: MEDICARE

## 2024-06-03 DIAGNOSIS — M25.611 DECREASED RIGHT SHOULDER RANGE OF MOTION: ICD-10-CM

## 2024-06-03 DIAGNOSIS — M89.8X1 PAIN OF RIGHT SCAPULA: ICD-10-CM

## 2024-06-03 DIAGNOSIS — M95.8 WINGING OF SCAPULA: Primary | ICD-10-CM

## 2024-06-03 PROCEDURE — 97110 THERAPEUTIC EXERCISES: CPT | Performed by: PHYSICAL THERAPIST

## 2024-06-03 PROCEDURE — 97140 MANUAL THERAPY 1/> REGIONS: CPT | Performed by: PHYSICAL THERAPIST

## 2024-06-03 NOTE — PROGRESS NOTES
Physical Therapy Daily Treatment Note      Patient: Estella Kaur   : 1945  Referring practitioner: Higinio Atwood, *  Date of Initial Visit: Type: THERAPY  Noted: 2024  Today's Date: 6/3/2024  Patient seen for 4 sessions       Visit Diagnoses:    ICD-10-CM ICD-9-CM   1. Winging of scapula  M95.8 736.89   2. Pain of right scapula  M89.8X1 733.90   3. Decreased right shoulder range of motion  M25.611 719.51       Subjective Evaluation    History of Present Illness    Subjective comment: Patient reports that she has noticed some of her exercises are getting a little easier.Pain  Current pain ratin           Objective   See Exercise, Manual, and Modality Logs for complete treatment.       Assessment & Plan       Assessment  Assessment details: Treatment session initiated with there ex, followed by STM.  There ex progressed to include increased repetitions, addition of 1# weight, and the addition of new exercises (standing shoulder flex, scaption).  Patient required verbal, tactile, and visual cues to ensure proper form with exercises.  STM performed at right scapular region, with tenderness reported at scapular border.  Patient will continue to be progressed per her tolerance and POC.          Timed:         Manual Therapy:    12     mins  63182;     Therapeutic Exercise:    31     mins  84953;     Neuromuscular Christie:        mins  09536;    Therapeutic Activity:          mins  96652;     Gait Training:           mins  23289;     Ultrasound:          mins  66273;    Ionto                                   mins   05542  Self Care                            mins   69153      Un-Timed:  Electrical Stimulation:         mins  83300 ( );  Dry Needling          mins self-pay  Traction          mins 73265  Canalith Repos         mins 20553    Timed Treatment:   43   mins   Total Treatment:     43   mins    Karli Frankel PT  [place] License: [id number]  Electronically signed by Karli Frankel PT,  06/03/24, 11:53 AM EDT.

## 2024-06-06 ENCOUNTER — TREATMENT (OUTPATIENT)
Dept: PHYSICAL THERAPY | Facility: CLINIC | Age: 79
End: 2024-06-06
Payer: MEDICARE

## 2024-06-06 DIAGNOSIS — M95.8 WINGING OF SCAPULA: Primary | ICD-10-CM

## 2024-06-06 DIAGNOSIS — M89.8X1 PAIN OF RIGHT SCAPULA: ICD-10-CM

## 2024-06-06 DIAGNOSIS — M25.611 DECREASED RIGHT SHOULDER RANGE OF MOTION: ICD-10-CM

## 2024-06-06 NOTE — PROGRESS NOTES
Physical Therapy Progress Note  Patient: Estella Kaur   : 1945  Diagnosis/ICD-10 Code:  Winging of scapula [M95.8]  Referring practitioner: Higinio Atwood, *  Date of Initial Visit: Type: THERAPY  Noted: 2024  Today's Date: 2024  Patient seen for 5 sessions         Visit Diagnoses:    ICD-10-CM ICD-9-CM   1. Winging of scapula  M95.8 736.89   2. Pain of right scapula  M89.8X1 733.90   3. Decreased right shoulder range of motion  M25.611 719.51         Estella Kaur reports: She feels like her pain is improved with therapy, but does note that it sometimes increases up to 10/10 with certain activities.    Subjective Questionnaire: QuickDASH: 11.36% impaired  Clinical Progress: improved  Home Program Compliance: Yes        Subjective Evaluation    Pain  Current pain ratin  At best pain ratin  At worst pain rating: 10             Objective          Static Posture     Scapulae  Left winging, right winging, left protracted and right protracted.    Postural Observations  Seated posture: fair  Standing posture: fair      Active Range of Motion   Left Shoulder   Flexion: 140 degrees   Abduction: 129 degrees   External rotation BTH: C7   Internal rotation BTB: T7     Right Shoulder   Flexion: 139 degrees   Abduction: 132 degrees   External rotation BTH: C7   Internal rotation BTB: T7     Strength/Myotome Testing     Left Shoulder     Planes of Motion   Flexion: 4   Abduction: 4   External rotation at 0°: 4   Internal rotation at 0°: 4     Right Shoulder     Planes of Motion   Flexion: 4   Abduction: 4-   External rotation at 0°: 4-   Internal rotation at 0°: 4           Assessment & Plan       Assessment  Impairments: abnormal muscle firing, abnormal or restricted ROM, activity intolerance, impaired physical strength, lacks appropriate home exercise program and pain with function   Functional limitations: carrying objects, lifting, sleeping, pulling, pushing, uncomfortable because of pain  and unable to perform repetitive tasks   Assessment details: Patient has been attending physical therapy for right scapular winging; she has attended therapy for a total of 5 sessions. Patient has shown improvements in shoulder ROM and posture.  No measurable improvements were noted with MMT.  Patient reports a 4.55% improvement in functional mobility impairment, based on the her response to the QuickDash; she currently reports a 11.36% impairment.  Patient will continue to benefit from skilled PT, so that she can achieve maximum level of function; discharge anticipated once patient has utilized approved insurance visits..     Prognosis: good    Goals  Plan Goals: SHORT TERM GOALS:     4 weeks  1. Patient will be independent/compliant with HEP.  Met  2. Patient will report pain no greater than 5/10 when performing self-care activities.  Ongoing, progressing    LONG TERM GOALS:   12 weeks  1. Patient will report pain no greater than 2/10 when performing household chores or yard work.  Ongoing, progressing-up to 6/10  2. Patient to demonstrate ability to lift 5# overhead with the right arm without increase in pain.  Met  3. Patient to report no more than 8% impairment on the Quick Dash for improved functional independence.  Ongoing, progressing-11.36% impairments  4. Right UE strength will improve to at least 4/5 to allow for greater ease with lifting or carrying objects.  Ongoing, progressing    Plan  Therapy options: will be seen for skilled therapy services  Planned modality interventions: cryotherapy, electrical stimulation/Russian stimulation, TENS, thermotherapy (hydrocollator packs) and ultrasound  Planned therapy interventions: manual therapy, ADL retraining, neuromuscular re-education, body mechanics training, postural training, soft tissue mobilization, functional ROM exercises, strengthening, stretching, home exercise program, therapeutic activities, IADL retraining and joint mobilization  Frequency: 2x  week  Duration in weeks: 4  Treatment plan discussed with: patient  Plan details: Re-evaluation   65724    Therapeutic exercise  44891  Therapeutic activity    09178  Neuromuscular re-education   38543  Manual therapy   71208    Unattended e-stim (Medicaid/Medicare)     Moist heat/cryotherapy 58854   Ultrasound   29519               Recommendations: Continue as planned  Timeframe: 1 month  Prognosis to achieve goals: good      Timed:         Manual Therapy:    15     mins  42564;     Therapeutic Exercise:     40    mins  77195;     Neuromuscular Christie:        mins  57307;    Therapeutic Activity:          mins  27913;     Gait Training:           mins  15877;     Ultrasound:          mins  02780;    Ionto                                   mins   26348  Self Care                            mins   59529    Un-Timed:  Electrical Stimulation:         mins  10735 ( );  Dry Needling          mins self-pay  Traction          mins 05595  Re-Eval                               mins  40292  Canalith Repos         mins 56895    Timed Treatment:   55   mins   Total Treatment:     55   mins          PT: Karli Frankel PT     [place] License:  [id number]    Electronically signed by Karli Frankel PT, 06/06/24, 10:38 AM EDT    Certification Period: 6/6/2024 thru 9/3/2024  I certify that the therapy services are furnished while this patient is under my care.  The services outlined above are required by this patient, and will be reviewed every 90 days.         Physician Signature:__________________________________________________    PHYSICIAN: Higinio Atwood MD  NPI: [id number]                                      DATE:  :     Please sign and return via fax to [email or web address] . Thank you, [facility]

## 2024-06-11 ENCOUNTER — TREATMENT (OUTPATIENT)
Dept: PHYSICAL THERAPY | Facility: CLINIC | Age: 79
End: 2024-06-11
Payer: MEDICARE

## 2024-06-11 DIAGNOSIS — M89.8X1 PAIN OF RIGHT SCAPULA: ICD-10-CM

## 2024-06-11 DIAGNOSIS — M25.611 DECREASED RIGHT SHOULDER RANGE OF MOTION: ICD-10-CM

## 2024-06-11 DIAGNOSIS — M95.8 WINGING OF SCAPULA: Primary | ICD-10-CM

## 2024-06-11 NOTE — PROGRESS NOTES
Physical Therapy Daily Treatment Note/Discharge      Patient: Estella Kaur   : 1945  Referring practitioner: Higinio Atwood, *  Date of Initial Visit: Type: THERAPY  Noted: 2024  Today's Date: 2024  Patient seen for 6 sessions       Visit Diagnoses:    ICD-10-CM ICD-9-CM   1. Winging of scapula  M95.8 736.89   2. Pain of right scapula  M89.8X1 733.90   3. Decreased right shoulder range of motion  M25.611 719.51       Subjective Evaluation    History of Present Illness    Subjective comment: Patient notes that she has no pain today.  She feels like she is ready to be discharged.     Objective   See Exercise, Manual, and Modality Logs for complete treatment.       Assessment & Plan       Assessment  Assessment details: Treatment session consisted of there ex, per flow sheet.  Exercises progressed to include increased repetitions.  Patient was provided with verbal and visual cues to ensure proper form with exercises.  Patient requested to not perform manual therapy at today's session.  Patient will be discharged at this time, due to utilizing all approved insurance visits.  She is encouraged to continue with HEP following discharge from therapy.      Timed:         Manual Therapy:         mins  81795;     Therapeutic Exercise:    41     mins  48547;     Neuromuscular Christie:        mins  97458;    Therapeutic Activity:          mins  09948;     Gait Training:           mins  62488;     Ultrasound:          mins  62203;    Ionto                                   mins   53721  Self Care                            mins   53217      Un-Timed:  Electrical Stimulation:         mins  42951 ( );  Dry Needling          mins self-pay  Traction          mins 66399  Canalith Repos         mins 25867    Timed Treatment:   41   mins   Total Treatment:    41    mins    Karli Frankel PT  [place] License: [id number]  Electronically signed by Karli Frankel PT, 24, 11:46 AM EDT.

## 2024-06-24 ENCOUNTER — OFFICE VISIT (OUTPATIENT)
Dept: GASTROENTEROLOGY | Facility: CLINIC | Age: 79
End: 2024-06-24
Payer: MEDICARE

## 2024-06-24 VITALS
HEART RATE: 78 BPM | SYSTOLIC BLOOD PRESSURE: 165 MMHG | DIASTOLIC BLOOD PRESSURE: 81 MMHG | BODY MASS INDEX: 21.31 KG/M2 | OXYGEN SATURATION: 96 % | WEIGHT: 135.8 LBS | HEIGHT: 67 IN

## 2024-06-24 DIAGNOSIS — K22.70 BARRETT'S ESOPHAGUS WITHOUT DYSPLASIA: Primary | ICD-10-CM

## 2024-06-24 PROCEDURE — 1160F RVW MEDS BY RX/DR IN RCRD: CPT

## 2024-06-24 PROCEDURE — 99203 OFFICE O/P NEW LOW 30 MIN: CPT

## 2024-06-24 PROCEDURE — 1159F MED LIST DOCD IN RCRD: CPT

## 2024-06-24 RX ORDER — AMLODIPINE BESYLATE 2.5 MG/1
2.5 TABLET ORAL
COMMUNITY
Start: 2024-06-04

## 2024-06-24 NOTE — PROGRESS NOTES
"Date of Consultation:  6/24/2024  Referring Physician: No ref. provider found    Chief Complaint  Establish Care (Patient is wanting to establish care with new Gastroenterologist physician. Patient has history of Sapp's Esophagus.  ) and Heartburn    Estella Kaur is a 78 y.o. female who presents today to [facility] for Establish Care (Patient is wanting to establish care with new Gastroenterologist physician. Patient has history of Sapp's Esophagus.  ) and Heartburn.    HPI:   78-year-old female with PMH of Sapp's esophagus who presents today for follow-up.  Patient had EGD with Dr. Ramos in 2018 that was notable for Sapp's esophagus with intestinal metaplasia on biopsy.  Patient was started on Nexium 40 mg daily.  She had a repeat EGD in 2019 with Dr. Farah that was also notable for short segment Sapp's esophagus, small hiatal hernia, and diffuse chronic gastritis.  Pathology was notable for antrum biopsies, reactive gastropathy with minimal chronic inflammation and focal fibrosis without intestinal metaplasia or dysplasia, negative H. pylori.  Stomach biopsies: Benign gastric fundic type mucosa with parietal cell hypertrophy and focal chronic inflammation suggestive of PPI effect no intestinal metaplasia or dysplasia, no H. pylori.  Esophagus biopsies notable for reactive esophagogastric mucosa with chronic inflammation and focal intestinal metaplasia, compatible with known history of Sapp's esophagus negative for dysplasia.  Patient reports that her acid reflux is well-controlled on Nexium 40 mg once per day.  States that the only time she has flares when she eats \"food that she can eat.\"  He reports onions often flare her acid reflux.  She currently drinks 3 cups of coffee each day and drinks one half can sugar-free soda.  She denies dysphagia, unintentional weight loss, nausea, vomiting, abdominal pain, melena, and hematochezia.       Previous " History:   Past Medical History:   Diagnosis Date    Abnormal ECG     Arthritis     Coronary artery disease In Hospital 7/8 PVC's    Started meds for same.    Deep vein thrombosis PE    30 yrs ago    Disease of thyroid gland     Elevated cholesterol     Fracture of right distal radius     Fracture of wrist     Fracture, radius     Fracture, ulna     GERD (gastroesophageal reflux disease)     Headache     Occasional headache, not migraines    Hip arthrosis     History of transfusion     Hypercholesteremia     Hypertension       Past Surgical History:   Procedure Laterality Date    APPENDECTOMY      CEREBRAL ANGIOGRAM  MRA 1/26/22    CHOLECYSTECTOMY      COLONOSCOPY      COLONOSCOPY N/A 05/15/2018    Procedure: COLONOSCOPY  CPTCODE:11529;  Surgeon: Wade Ramos III, MD;  Location: [facility];  Service: Gastroenterology    ENDOSCOPY      ENDOSCOPY N/A 05/15/2018    Procedure: ESOPHAGOGASTRODUODENOSCOPY WITH BIOPSY  CPTCODE:60209;  Surgeon: Wade Ramos III, MD;  Location: [facility];  Service: Gastroenterology    EPIDURAL BLOCK  When I had hip surgery    HAND SURGERY  08/24/2016    HYSTERECTOMY      JOINT REPLACEMENT        hips    both    NECK SURGERY  Thyroid    ORIF WRIST FRACTURE Right 08/24/2016    Procedure: WRIST OPEN REDUCTION INTERNAL FIXATION;  Surgeon: Shadi Womack MD;  Location: [facility];  Service:     THYROID SURGERY  2002    thyroidectomy w/ metal clips    TOTAL HIP ARTHROPLASTY      bilateral       Social History     Socioeconomic History    Marital status:    Tobacco Use    Smoking status: Never    Smokeless tobacco: Never   Vaping Use    Vaping status: Never Used   Substance and Sexual Activity    Alcohol use: No    Drug use: No    Sexual activity: Yes     Partners: Male     Birth control/protection: Post-menopausal, Surgical     Comment: Post hysterectomy        Current Medications:  Current Outpatient Medications   Medication Sig Dispense Refill    amLODIPine  "(NORVASC) 2.5 MG tablet Take 1 tablet by mouth every night at bedtime.      Coenzyme Q10 ([employer] CoQ-10) 200 MG capsule       esomeprazole (nexIUM) 40 MG capsule Take 1 capsule by mouth Every Morning Before Breakfast. 30 capsule 11    Ibuprofen 200 MG capsule       losartan (COZAAR) 50 MG tablet       lovastatin (MEVACOR) 40 MG tablet Take 1 tablet by mouth Every Night.      metoprolol succinate XL (TOPROL-XL) 25 MG 24 hr tablet Take 1 tablet by mouth Daily.      Misc Natural Products (Elderberry Zinc/Vit C/Immune) lozenge        No current facility-administered medications for this visit.       Allergies:   Allergies   Allergen Reactions    Codeine Nausea And Vomiting    Levofloxacin Nausea And Vomiting       Vitals:   /81 (BP Location: Left arm, Patient Position: Sitting, Cuff Size: Adult)   Pulse 78   Ht 170.2 cm (67\")   Wt 61.6 kg (135 lb 12.8 oz)   LMP 08/24/1969 (Within Months)   SpO2 96%   BMI 21.27 kg/m²   Estimated body mass index is 21.27 kg/m² as calculated from the following:    Height as of this encounter: 170.2 cm (67\").    Weight as of this encounter: 61.6 kg (135 lb 12.8 oz).    Estella Kaur  reports that she has never smoked. She has never used smokeless tobacco. I have educated her on the risk of diseases from using tobacco products such as cancer, COPD, and heart disease.         ROS:   Review of Systems   Constitutional: Negative.    HENT: Negative.     Respiratory: Negative.     Cardiovascular: Negative.    Gastrointestinal: Negative.    Musculoskeletal: Negative.    Skin: Negative.    Psychiatric/Behavioral: Negative.     All other systems reviewed and are negative.       Physical Exam:   Physical Exam  Vitals reviewed.   Constitutional:       Appearance: Normal appearance.   HENT:      Head: Normocephalic and atraumatic.      Nose: Nose normal.      Mouth/Throat:      Mouth: Mucous membranes are moist.   Eyes:      Extraocular Movements: Extraocular movements intact. "   Cardiovascular:      Rate and Rhythm: Normal rate and regular rhythm.      Pulses: Normal pulses.      Heart sounds: Normal heart sounds.   Pulmonary:      Effort: Pulmonary effort is normal.      Breath sounds: Normal breath sounds.   Abdominal:      General: Abdomen is flat. Bowel sounds are normal. There is no distension.      Palpations: Abdomen is soft. There is no mass.      Tenderness: There is no abdominal tenderness. There is no guarding or rebound.      Hernia: No hernia is present.   Musculoskeletal:         General: Normal range of motion.   Skin:     General: Skin is warm and dry.   Neurological:      Mental Status: She is alert and oriented to person, place, and time.   Psychiatric:         Mood and Affect: Mood normal.         Behavior: Behavior normal.         Thought Content: Thought content normal.          Lab Results:   Lab Results   Component Value Date    WBC 5.85 07/08/2018    HGB 14.3 07/08/2018    HCT 41.7 07/08/2018    MCV 85.3 07/08/2018    RDW 13.0 07/08/2018     07/08/2018    NEUTRORELPCT 57.9 07/08/2018    LYMPHORELPCT 30.8 07/08/2018    MONORELPCT 7.7 07/08/2018    EOSRELPCT 2.9 07/08/2018    BASORELPCT 0.5 07/08/2018    NEUTROABS 3.39 07/08/2018    LYMPHSABS 1.80 07/08/2018       Lab Results   Component Value Date     07/08/2018    K 3.8 07/08/2018    CO2 25.5 07/08/2018     07/08/2018    BUN 19 07/08/2018    CREATININE 0.70 11/17/2022    EGFRIFNONA 68 07/08/2018    GLUCOSE 80 07/08/2018    CALCIUM 9.6 07/08/2018    ALKPHOS 110 (H) 07/08/2018    AST 23 07/08/2018    ALT 21 07/08/2018    BILITOT 0.6 07/08/2018    ALBUMIN 4.50 07/08/2018    PROTEINTOT 7.2 07/08/2018       Pathology:        Endoscopy:        Imaging:  CT Chest Without Contrast Diagnostic    Result Date: 4/25/2024  1. No acute thoracic findings.  2. Other findings as above.      This report was finalized on 4/25/2024 12:42 PM by Dr. Hakeem Swain MD.      XR Chest 2 View    Result Date: 4/18/2024     Degenerative changes thoracic spine as described.   This report was finalized on 4/18/2024 8:33 AM by Dr. Calvin Worrell MD.      XR Shoulder 2+ View Right    Result Date: 4/18/2024  1.  Degenerative change of the greater tuberosity. 2.  Sublabral bony cystic change of the glenoid. Degenerative change of the glenohumeral joint.   This report was finalized on 4/18/2024 8:12 AM by Dr. Calvin Worrell MD.      Mammo Screening Digital Tomosynthesis Bilateral With CAD    Result Date: 3/11/2024  BI-RADS CATEGORY II: BENIGN  MANAGEMENT: Routine Mammography Screening  COMMUNICATION: A lay language written letter was sent to the patient regarding negative results.  PLEASE NOTE THE FOLLOWING ACR RECOMMENDATIONS: A:  Only 80% of breast cancers can be diagnosed by mammography. B:  A negative mammogram does not exclude cancer in clinically palpable abnormalities.  Biopsy should be done based on ultrasound findings and clinical judgment. C:  A mammogram has limited value in detecting cancer in patients with adenosis or dense breasts.  [employer] GUIDELINES For breast cancer detection in asymptomatic women: Age  ACR Recommendations 35-40  Baseline Mammogram 40-49  Annual or Biannual Mammogram 50+  Annual Mammogram Annual physical and frequent self breast examination.  Patient has been entered into an automatic reminder system.      This report was finalized on 3/11/2024 11:41 AM by Dr. Ludwin Avila MD.            Results review: During today's encounter, all relevant clinical data has been reviewed.      Assessment and Plan    1. Sapp's esophagus without dysplasia (Primary)  Most recent EGD performed in 2019 by Dr. Farah was notable for Sapp's esophagus.  Patient has been on Nexium 40 mg once daily and reports that her acid reflux has been well-controlled.  Discussed management for GERD: encouraged weight loss, HOB elevation at night, avoidance of meals 2-3 hours before bedtime, avoid trigger foods  (caffeine, alcohol, chocolate, acidic/spicy foods e.g oranges/tomatoes), and encouraged smoking cessation  Will proceed with EGD as recommended per guidelines.  Patient educated on risk and benefits of procedure.  She verbalized understanding and is amenable to proceeding.  -     Case Request; Standing  -     Follow Anesthesia Guidelines / Protocol; Standing  -     Obtain Informed Consent; Standing  -     Case Request      New Medications:   No orders of the defined types were placed in this encounter.      Discontinued Medications:   Medications Discontinued During This Encounter   Medication Reason    amLODIPine Besylate-Celecoxib 2.5-200 MG tablet Patient Reported Not Taking        Visit Diagnoses:    ICD-10-CM ICD-9-CM   1. Sapp's esophagus without dysplasia  K22.70 530.85            Follow Up:   Return in about 3 months (around 9/24/2024).    The patient was in agreement with the plan and all questions were answered to patient's satisfaction.        This document has been electronically signed by Kellie Nesbitt PA-C   June 24, 2024 14:01 EDT    Dictated Utilizing Dragon Dictation: Part of this note may be an electronic transcription/translation of spoken language to printed text using the Dragon Dictation System.    CC:  No ref. provider found  Lisa Rosario PA

## 2024-06-27 ENCOUNTER — PATIENT ROUNDING (BHMG ONLY) (OUTPATIENT)
Dept: GASTROENTEROLOGY | Facility: CLINIC | Age: 79
End: 2024-06-27
Payer: MEDICARE

## 2024-07-17 PROBLEM — K22.70 BARRETT'S ESOPHAGUS WITHOUT DYSPLASIA: Status: ACTIVE | Noted: 2024-06-24

## 2025-03-07 ENCOUNTER — TRANSCRIBE ORDERS (OUTPATIENT)
Dept: ADMINISTRATIVE | Facility: HOSPITAL | Age: 80
End: 2025-03-07
Payer: MEDICARE

## 2025-03-07 DIAGNOSIS — Z12.31 VISIT FOR SCREENING MAMMOGRAM: Primary | ICD-10-CM

## 2025-03-21 ENCOUNTER — HOSPITAL ENCOUNTER (OUTPATIENT)
Facility: HOSPITAL | Age: 80
Discharge: HOME | End: 2025-03-21
Admitting: PHYSICIAN ASSISTANT
Payer: MEDICARE

## 2025-03-21 DIAGNOSIS — Z12.31 VISIT FOR SCREENING MAMMOGRAM: ICD-10-CM

## 2025-03-21 PROCEDURE — 77067 SCR MAMMO BI INCL CAD: CPT

## 2025-03-21 PROCEDURE — 77063 BREAST TOMOSYNTHESIS BI: CPT

## 2025-07-27 ENCOUNTER — HOSPITAL ENCOUNTER (EMERGENCY)
Facility: HOSPITAL | Age: 80
Discharge: HOME | End: 2025-07-27
Payer: MEDICARE

## 2025-07-27 ENCOUNTER — APPOINTMENT (OUTPATIENT)
Dept: GENERAL RADIOLOGY | Facility: HOSPITAL | Age: 80
End: 2025-07-27
Payer: MEDICARE

## 2025-07-27 VITALS
DIASTOLIC BLOOD PRESSURE: 72 MMHG | TEMPERATURE: 97.9 F | BODY MASS INDEX: 23 KG/M2 | HEIGHT: 62 IN | OXYGEN SATURATION: 98 % | SYSTOLIC BLOOD PRESSURE: 157 MMHG | RESPIRATION RATE: 16 BRPM | HEART RATE: 72 BPM | WEIGHT: 125 LBS

## 2025-07-27 DIAGNOSIS — M25.571 RIGHT ANKLE PAIN, UNSPECIFIED CHRONICITY: Primary | ICD-10-CM

## 2025-07-27 LAB
ALBUMIN SERPL-MCNC: 4.1 G/DL (ref 3.5–5.2)
ALBUMIN/GLOB SERPL: 1.7 G/DL
ALP SERPL-CCNC: 104 U/L (ref 39–117)
ALT SERPL W P-5'-P-CCNC: 22 U/L (ref 1–33)
ANION GAP SERPL CALCULATED.3IONS-SCNC: 12.5 MMOL/L (ref 5–15)
AST SERPL-CCNC: 30 U/L (ref 1–32)
BASOPHILS # BLD AUTO: 0.05 10*3/MM3 (ref 0–0.2)
BASOPHILS NFR BLD AUTO: 0.7 % (ref 0–1.5)
BILIRUB SERPL-MCNC: 0.2 MG/DL (ref 0–1.2)
BUN SERPL-MCNC: 15.7 MG/DL (ref 8–23)
BUN/CREAT SERPL: 20.4 (ref 7–25)
CALCIUM SPEC-SCNC: 8.8 MG/DL (ref 8.6–10.5)
CHLORIDE SERPL-SCNC: 105 MMOL/L (ref 98–107)
CO2 SERPL-SCNC: 22.5 MMOL/L (ref 22–29)
CREAT SERPL-MCNC: 0.77 MG/DL (ref 0.57–1)
DEPRECATED RDW RBC AUTO: 41 FL (ref 37–54)
EGFRCR SERPLBLD CKD-EPI 2021: 78.1 ML/MIN/1.73
EOSINOPHIL # BLD AUTO: 0.21 10*3/MM3 (ref 0–0.4)
EOSINOPHIL NFR BLD AUTO: 2.8 % (ref 0.3–6.2)
ERYTHROCYTE [DISTWIDTH] IN BLOOD BY AUTOMATED COUNT: 12.6 % (ref 12.3–15.4)
GLOBULIN UR ELPH-MCNC: 2.4 GM/DL
GLUCOSE SERPL-MCNC: 123 MG/DL (ref 65–99)
HCT VFR BLD AUTO: 40.4 % (ref 34–46.6)
HGB BLD-MCNC: 13 G/DL (ref 12–15.9)
HOLD SPECIMEN: NORMAL
HOLD SPECIMEN: NORMAL
IMM GRANULOCYTES # BLD AUTO: 0.02 10*3/MM3 (ref 0–0.05)
IMM GRANULOCYTES NFR BLD AUTO: 0.3 % (ref 0–0.5)
LYMPHOCYTES # BLD AUTO: 1.33 10*3/MM3 (ref 0.7–3.1)
LYMPHOCYTES NFR BLD AUTO: 17.6 % (ref 19.6–45.3)
MCH RBC QN AUTO: 29 PG (ref 26.6–33)
MCHC RBC AUTO-ENTMCNC: 32.2 G/DL (ref 31.5–35.7)
MCV RBC AUTO: 90.2 FL (ref 79–97)
MONOCYTES # BLD AUTO: 0.36 10*3/MM3 (ref 0.1–0.9)
MONOCYTES NFR BLD AUTO: 4.8 % (ref 5–12)
NEUTROPHILS NFR BLD AUTO: 5.59 10*3/MM3 (ref 1.7–7)
NEUTROPHILS NFR BLD AUTO: 73.8 % (ref 42.7–76)
NRBC BLD AUTO-RTO: 0 /100 WBC (ref 0–0.2)
PLATELET # BLD AUTO: 196 10*3/MM3 (ref 140–450)
PMV BLD AUTO: 10.6 FL (ref 6–12)
POTASSIUM SERPL-SCNC: 4.1 MMOL/L (ref 3.5–5.2)
PROT SERPL-MCNC: 6.5 G/DL (ref 6–8.5)
RBC # BLD AUTO: 4.48 10*6/MM3 (ref 3.77–5.28)
SODIUM SERPL-SCNC: 140 MMOL/L (ref 136–145)
URATE SERPL-MCNC: 3.6 MG/DL (ref 2.4–5.7)
WBC NRBC COR # BLD AUTO: 7.56 10*3/MM3 (ref 3.4–10.8)
WHOLE BLOOD HOLD COAG: NORMAL
WHOLE BLOOD HOLD SPECIMEN: NORMAL

## 2025-07-27 PROCEDURE — 73610 X-RAY EXAM OF ANKLE: CPT | Performed by: RADIOLOGY

## 2025-07-27 PROCEDURE — 85025 COMPLETE CBC W/AUTO DIFF WBC: CPT | Performed by: PHYSICIAN ASSISTANT

## 2025-07-27 PROCEDURE — 73610 X-RAY EXAM OF ANKLE: CPT

## 2025-07-27 PROCEDURE — 99283 EMERGENCY DEPT VISIT LOW MDM: CPT

## 2025-07-27 PROCEDURE — 36415 COLL VENOUS BLD VENIPUNCTURE: CPT

## 2025-07-27 PROCEDURE — 84550 ASSAY OF BLOOD/URIC ACID: CPT | Performed by: PHYSICIAN ASSISTANT

## 2025-07-27 PROCEDURE — 80053 COMPREHEN METABOLIC PANEL: CPT | Performed by: PHYSICIAN ASSISTANT

## 2025-07-27 RX ORDER — HYDROCODONE BITARTRATE AND ACETAMINOPHEN 7.5; 325 MG/1; MG/1
1 TABLET ORAL ONCE
Refills: 0 | Status: DISCONTINUED | OUTPATIENT
Start: 2025-07-27 | End: 2025-07-27 | Stop reason: HOSPADM

## 2025-07-27 RX ORDER — ACETAMINOPHEN 500 MG
1000 TABLET ORAL ONCE
Status: COMPLETED | OUTPATIENT
Start: 2025-07-27 | End: 2025-07-27

## 2025-07-27 RX ADMIN — ACETAMINOPHEN 1000 MG: 500 TABLET, FILM COATED ORAL at 20:27

## 2025-07-28 NOTE — ED PROVIDER NOTES
Subjective   History of Present Illness  79-year-old female presents to ED with right ankle pain. Patient states she was standing in the kitchen when all of a sudden she developed right ankle pain. Patient did not injure her ankle. She states it was swelling so she decided to get it checked out. Denies any other related symptoms.         Review of Systems   Constitutional: Negative.  Negative for fever.   HENT: Negative.     Respiratory: Negative.     Cardiovascular: Negative.  Negative for chest pain.   Gastrointestinal: Negative.  Negative for abdominal pain.   Endocrine: Negative.    Genitourinary: Negative.  Negative for dysuria.   Musculoskeletal:         Right ankle pain   Skin: Negative.    Neurological: Negative.    Psychiatric/Behavioral: Negative.     All other systems reviewed and are negative.      Past Medical History:   Diagnosis Date    Abnormal ECG     Arthritis     Coronary artery disease In Hospital 7/8 PVC's    Started meds for same.    Deep vein thrombosis PE    30 yrs ago    Disease of thyroid gland     Elevated cholesterol     Fracture of right distal radius     Fracture of wrist     Fracture, radius     Fracture, ulna     GERD (gastroesophageal reflux disease)     Headache     Occasional headache, not migraines    Hip arthrosis     History of transfusion     Hypercholesteremia     Hypertension        Allergies   Allergen Reactions    Codeine Nausea And Vomiting    Levofloxacin Nausea And Vomiting       Past Surgical History:   Procedure Laterality Date    APPENDECTOMY      CEREBRAL ANGIOGRAM  MRA 1/26/22    CHOLECYSTECTOMY      COLONOSCOPY      COLONOSCOPY N/A 05/15/2018    Procedure: COLONOSCOPY  CPTCODE:78783;  Surgeon: Wade Ramos III, MD;  Location: [facility];  Service: Gastroenterology    ENDOSCOPY      ENDOSCOPY N/A 05/15/2018    Procedure: ESOPHAGOGASTRODUODENOSCOPY WITH BIOPSY  CPTCODE:48956;  Surgeon: Wade Ramos III, MD;  Location: [facility];  Service:  Gastroenterology    EPIDURAL BLOCK  When I had hip surgery    HAND SURGERY  08/24/2016    HYSTERECTOMY      JOINT REPLACEMENT        hips    both    NECK SURGERY  Thyroid    ORIF WRIST FRACTURE Right 08/24/2016    Procedure: WRIST OPEN REDUCTION INTERNAL FIXATION;  Surgeon: Shadi Womack MD;  Location: [facility];  Service:     THYROID SURGERY  2002    thyroidectomy w/ metal clips    TOTAL HIP ARTHROPLASTY      bilateral        Family History   Problem Relation Age of Onset    Hyperlipidemia Mother     Cancer Mother     Thyroid disease Mother     Hyperlipidemia Father     Alcohol abuse Father     Alcohol abuse Sister     Alcohol abuse Brother     Cancer Sister     Breast cancer Neg Hx        Social History     Socioeconomic History    Marital status:    Tobacco Use    Smoking status: Never    Smokeless tobacco: Never   Vaping Use    Vaping status: Never Used   Substance and Sexual Activity    Alcohol use: No    Drug use: No    Sexual activity: Yes     Partners: Male     Birth control/protection: Post-menopausal, Surgical     Comment: Post hysterectomy           Objective   Physical Exam  Vitals and nursing note reviewed.   Constitutional:       General: She is not in acute distress.     Appearance: She is well-developed. She is not diaphoretic.   HENT:      Head: Normocephalic and atraumatic.      Right Ear: External ear normal.      Left Ear: External ear normal.      Nose: Nose normal.   Eyes:      Conjunctiva/sclera: Conjunctivae normal.      Pupils: Pupils are equal, round, and reactive to light.   Neck:      Vascular: No JVD.      Trachea: No tracheal deviation.   Cardiovascular:      Rate and Rhythm: Normal rate and regular rhythm.      Heart sounds: Normal heart sounds. No murmur heard.  Pulmonary:      Effort: Pulmonary effort is normal. No respiratory distress.      Breath sounds: Normal breath sounds. No wheezing.   Abdominal:      General: Bowel sounds are normal.      Palpations:  Abdomen is soft.      Tenderness: There is no abdominal tenderness.   Musculoskeletal:         General: No deformity. Normal range of motion.      Cervical back: Normal range of motion and neck supple.      Comments: Right ankle pain with dorsiflexion   Skin:     General: Skin is warm and dry.      Coloration: Skin is not pale.      Findings: No erythema or rash.   Neurological:      Mental Status: She is alert and oriented to person, place, and time.      Cranial Nerves: No cranial nerve deficit.   Psychiatric:         Behavior: Behavior normal.         Thought Content: Thought content normal.         Procedures           ED Course             Results for orders placed or performed during the hospital encounter of 07/27/25   Comprehensive Metabolic Panel    Collection Time: 07/27/25  8:14 PM    Specimen: Arm, Right; Blood   Result Value Ref Range    Glucose 123 (H) 65 - 99 mg/dL    BUN 15.7 8.0 - 23.0 mg/dL    Creatinine 0.77 0.57 - 1.00 mg/dL    Sodium 140 136 - 145 mmol/L    Potassium 4.1 3.5 - 5.2 mmol/L    Chloride 105 98 - 107 mmol/L    CO2 22.5 22.0 - 29.0 mmol/L    Calcium 8.8 8.6 - 10.5 mg/dL    Total Protein 6.5 6.0 - 8.5 g/dL    Albumin 4.1 3.5 - 5.2 g/dL    ALT (SGPT) 22 1 - 33 U/L    AST (SGOT) 30 1 - 32 U/L    Alkaline Phosphatase 104 39 - 117 U/L    Total Bilirubin 0.2 0.0 - 1.2 mg/dL    Globulin 2.4 gm/dL    A/G Ratio 1.7 g/dL    BUN/Creatinine Ratio 20.4 7.0 - 25.0    Anion Gap 12.5 5.0 - 15.0 mmol/L    eGFR 78.1 >60.0 mL/min/1.73   Uric Acid    Collection Time: 07/27/25  8:14 PM    Specimen: Arm, Right; Blood   Result Value Ref Range    Uric Acid 3.6 2.4 - 5.7 mg/dL   CBC Auto Differential    Collection Time: 07/27/25  8:14 PM    Specimen: Arm, Right; Blood   Result Value Ref Range    WBC 7.56 3.40 - 10.80 10*3/mm3    RBC 4.48 3.77 - 5.28 10*6/mm3    Hemoglobin 13.0 12.0 - 15.9 g/dL    Hematocrit 40.4 34.0 - 46.6 %    MCV 90.2 79.0 - 97.0 fL    MCH 29.0 26.6 - 33.0 pg    MCHC 32.2 31.5 - 35.7 g/dL     RDW 12.6 12.3 - 15.4 %    RDW-SD 41.0 37.0 - 54.0 fl    MPV 10.6 6.0 - 12.0 fL    Platelets 196 140 - 450 10*3/mm3    Neutrophil % 73.8 42.7 - 76.0 %    Lymphocyte % 17.6 (L) 19.6 - 45.3 %    Monocyte % 4.8 (L) 5.0 - 12.0 %    Eosinophil % 2.8 0.3 - 6.2 %    Basophil % 0.7 0.0 - 1.5 %    Immature Grans % 0.3 0.0 - 0.5 %    Neutrophils, Absolute 5.59 1.70 - 7.00 10*3/mm3    Lymphocytes, Absolute 1.33 0.70 - 3.10 10*3/mm3    Monocytes, Absolute 0.36 0.10 - 0.90 10*3/mm3    Eosinophils, Absolute 0.21 0.00 - 0.40 10*3/mm3    Basophils, Absolute 0.05 0.00 - 0.20 10*3/mm3    Immature Grans, Absolute 0.02 0.00 - 0.05 10*3/mm3    nRBC 0.0 0.0 - 0.2 /100 WBC   Green Top (Gel)    Collection Time: 07/27/25  8:14 PM   Result Value Ref Range    Extra Tube Hold for add-ons.    Lavender Top    Collection Time: 07/27/25  8:14 PM   Result Value Ref Range    Extra Tube hold for add-on    Gold Top - SST    Collection Time: 07/27/25  8:14 PM   Result Value Ref Range    Extra Tube Hold for add-ons.    Light Blue Top    Collection Time: 07/27/25  8:14 PM   Result Value Ref Range    Extra Tube Hold for add-ons.      XR Ankle 3+ View Right  Result Date: 7/27/2025   Tibiotalar joint effusion. No acute fracture or dislocation. Small plantar spur. No soft tissue swelling. No air in the soft tissues. No foreign body.  This report was finalized on 7/27/2025 10:48 PM by Carlo Vivas MD.                                                  Medical Decision Making  79-year-old female presents to ED with right ankle pain. Patient states she was standing in the kitchen when all of a sudden she developed right ankle pain. Patient did not injure her ankle. She states it was swelling so she decided to get it checked out. Denies any other related symptoms.         Problems Addressed:  Right ankle pain, unspecified chronicity: complicated acute illness or injury    Amount and/or Complexity of Data Reviewed  Labs: ordered.  Radiology:  ordered.    Risk  OTC drugs.  Prescription drug management.        Final diagnoses:   Right ankle pain, unspecified chronicity       ED Disposition  ED Disposition       ED Disposition   Discharge    Condition   Stable    Comment   --               Lisa Rosario PA  [address] [address]  [phone]    In 3 days  If symptoms worsen         Medication List      No changes were made to your prescriptions during this visit.            Bhumika Cain, PA-C  07/29/25 1111

## (undated) DEVICE — SYR LUERLOK 30CC

## (undated) DEVICE — THE BITE BLOCK MAXI, LATEX FREE STRAP IS USED TO PROTECT THE ENDOSCOPE INSERTION TUBE FROM BEING BITTEN BY THE PATIENT.

## (undated) DEVICE — Device: Brand: ENDOGATOR

## (undated) DEVICE — CONN Y IRR DISP 1P/U

## (undated) DEVICE — FRCP BX RADJAW4 NDL 2.8 240CM LG OG BX40

## (undated) DEVICE — GOWN,REINF,POLY,ECL,PP SLV,XL: Brand: MEDLINE

## (undated) DEVICE — Device